# Patient Record
Sex: MALE | Race: WHITE | NOT HISPANIC OR LATINO | Employment: UNEMPLOYED | URBAN - METROPOLITAN AREA
[De-identification: names, ages, dates, MRNs, and addresses within clinical notes are randomized per-mention and may not be internally consistent; named-entity substitution may affect disease eponyms.]

---

## 2019-02-12 ENCOUNTER — OFFICE VISIT (OUTPATIENT)
Dept: FAMILY MEDICINE | Facility: HOSPITAL | Age: 34
End: 2019-02-12
Attending: FAMILY MEDICINE
Payer: MEDICAID

## 2019-02-12 VITALS
BODY MASS INDEX: 31.59 KG/M2 | DIASTOLIC BLOOD PRESSURE: 98 MMHG | HEIGHT: 70 IN | SYSTOLIC BLOOD PRESSURE: 138 MMHG | HEART RATE: 119 BPM | WEIGHT: 220.69 LBS

## 2019-02-12 DIAGNOSIS — Z13.220 SCREENING FOR CHOLESTEROL LEVEL: ICD-10-CM

## 2019-02-12 DIAGNOSIS — M72.0 DUPUYTREN'S CONTRACTURE OF LEFT HAND: Primary | ICD-10-CM

## 2019-02-12 PROCEDURE — 99203 OFFICE O/P NEW LOW 30 MIN: CPT | Performed by: FAMILY MEDICINE

## 2019-02-12 RX ORDER — POTASSIUM CHLORIDE 750 MG/1
10 CAPSULE, EXTENDED RELEASE ORAL DAILY
Refills: 5 | COMMUNITY
Start: 2019-01-19

## 2019-02-12 RX ORDER — VENLAFAXINE HYDROCHLORIDE 150 MG/1
150 CAPSULE, EXTENDED RELEASE ORAL DAILY
Refills: 5 | COMMUNITY
Start: 2019-01-19

## 2019-02-12 RX ORDER — PALIPERIDONE 9 MG/1
9 TABLET, EXTENDED RELEASE ORAL DAILY
Refills: 5 | COMMUNITY
Start: 2019-01-19

## 2019-02-12 RX ORDER — PALIPERIDONE PALMITATE 234 MG/1.5ML
156 INJECTION INTRAMUSCULAR
COMMUNITY
Start: 2019-01-28

## 2019-02-12 RX ORDER — CLOZAPINE 100 MG/1
100 TABLET ORAL NIGHTLY
COMMUNITY
Start: 2019-02-11

## 2019-02-12 NOTE — MEDICAL/APP STUDENT
Subjective:       Patient ID: Robert Callahan is a 33 y.o. overweight male with bipolar disorder here to establish care c/o L hand contracture    Chief Complaint: Establish Care    Patient is a 34 y/o male who presents to the clinic accompanied by his father to establish care with a PCP. He was recently diagnosed with bipolar (type unknown) after a 2 week inpatient psych stay in September 2018. He is followed closely by Dr. Jorge L Brush. Otherwise he has no past medical history. His only complaint is L hand contracture gradually worsening for the last 6 mos. His hand and fingers are fixed in a flexed position and can only be minimally manipulated. Moving the fingers causes some pain. He denies trauma to the hand. Denies CP, SOB, diaphoresis, fever, zelalem.       Review of Systems   Constitutional: Negative for activity change, appetite change, fever and unexpected weight change.   HENT: Negative for congestion, sore throat and trouble swallowing.    Respiratory: Negative for apnea, chest tightness and shortness of breath.    Cardiovascular: Negative for chest pain, palpitations and leg swelling.   Gastrointestinal: Negative for abdominal pain, diarrhea and nausea.   Endocrine: Negative for polydipsia, polyphagia and polyuria.   Genitourinary: Negative for difficulty urinating, dysuria and frequency.   Musculoskeletal: Negative for arthralgias, back pain and joint swelling.   Skin: Negative for color change, pallor and rash.   Neurological: Negative for dizziness, seizures and headaches.   Psychiatric/Behavioral: Negative for agitation, behavioral problems and dysphoric mood.       Objective:      Physical Exam   Constitutional: He is oriented to person, place, and time. He appears well-developed. No distress.   obese   HENT:   Head: Normocephalic and atraumatic.   Mouth/Throat: No oropharyngeal exudate.   Eyes: Conjunctivae and EOM are normal. Pupils are equal, round, and reactive to light. No scleral  icterus.   Neck: Normal range of motion. Neck supple.   Cardiovascular: Regular rhythm and normal heart sounds.   Tachycardic at 120 bpm   Abdominal: Bowel sounds are normal. He exhibits distension. There is no tenderness.   Musculoskeletal: Normal range of motion. He exhibits no edema or tenderness.   L hand contracted in flexed position, 2nd finger has normal ROM, fingers 3-5 are difficult to extend and illicit tenderness with attempt to extend. 5/5  strength on R, 3/5 on L.   Lymphadenopathy:     He has no cervical adenopathy.   Neurological: He is alert and oriented to person, place, and time. No cranial nerve deficit.   Skin: Skin is warm and dry. No rash noted. He is not diaphoretic. No erythema.   Psychiatric: His behavior is normal. Judgment and thought content normal.   Flat affect       Assessment:       Pt is a 33 y.o. Obese  male with bipolar disorder here to establish care c/o L hand contracture, found to be tachycardic in office.    Plan:       Dupuytren's Contracture vs Joint Disease vs Clenched Fist Syndrome  - ambulatory referral to PT  - will re-assess  - If worse at next visit may refer to hand surgery    Tachycardia  - ECG in office WNL  - will monitor    Obesity  - BMI >31  - counseled on healthy diet and options for exercise  - elevated BP in office, will re-assess at next visit  - ordered lipid panel    Bipolar Disorder  - well-controlled per pt and dad  - continue home meds  - closely followed by Dr. Jorge L Brush    RTC in 3 mos for f/u of lipid panel, BP, and hand contracture

## 2019-02-14 NOTE — PROGRESS NOTES
Subjective:       Patient ID: Robert Callahan is a 33 y.o. overweight male with bipolar disorder here to establish care c/o L hand contracture     Chief Complaint: L  hand contracture.     Patient is a 32 y/o male who presents to the clinic accompanied by his father to establish care with a PCP. He was recently diagnosed with bipolar (type unknown) after a 2 week inpatient psych stay in September 2018. He is followed closely by Dr. Jorge L Brush. Otherwise he has no past medical history. His only complaint is L hand contracture gradually worsening for the last 6 mos. His hand and fingers are fixed in a flexed position and can only be minimally manipulated. Moving the fingers causes some pain. He denies trauma to the hand. Denies CP, SOB, diaphoresis, fever, zelalem.         Review of Systems   Constitutional: Negative for activity change, appetite change, fever and unexpected weight change.   HENT: Negative for congestion, sore throat and trouble swallowing.    Respiratory: Negative for apnea, chest tightness and shortness of breath.    Cardiovascular: Negative for chest pain, palpitations and leg swelling.   Gastrointestinal: Negative for abdominal pain, diarrhea and nausea.   Endocrine: Negative for polydipsia, polyphagia and polyuria.   Genitourinary: Negative for difficulty urinating, dysuria and frequency.   Musculoskeletal: Negative for arthralgias, back pain and joint swelling.   Skin: Negative for color change, pallor and rash.   Neurological: Negative for dizziness, seizures and headaches.   Psychiatric/Behavioral: Negative for agitation, behavioral problems and dysphoric mood.       Objective:   Physical Exam   Constitutional: He is oriented to person, place, and time. He appears well-developed. No distress.   obese   HENT:   Head: Normocephalic and atraumatic.   Mouth/Throat: No oropharyngeal exudate.   Eyes: Conjunctivae and EOM are normal. Pupils are equal, round, and reactive to light. No  scleral icterus.   Neck: Normal range of motion. Neck supple.   Cardiovascular: Regular rhythm and normal heart sounds.   Tachycardic at 120 bpm   Abdominal: Bowel sounds are normal. He exhibits distension. There is no tenderness.   Musculoskeletal: Normal range of motion. He exhibits no edema or tenderness.   L hand contracted in flexed position, 2nd finger has normal ROM, fingers 3-5 are difficult to extend and illicit tenderness with attempt to extend. 5/5  strength on R, 3/5 on L.   Lymphadenopathy:     He has no cervical adenopathy.   Neurological: He is alert and oriented to person, place, and time. No cranial nerve deficit.   Skin: Skin is warm and dry. No rash noted. He is not diaphoretic. No erythema.   Psychiatric: His behavior is normal. Judgment and thought content normal.   Flat affect                               Assessment:              1. Dupuytren's Contracture, L hand.    2. Bipolar Disorder             Plan:       Dupuytren's Contracture vs Joint Disease vs Clenched Fist Syndrome  - ambulatory referral to PT  - will re-assess  - If worse at next visit may refer to hand surgery     Tachycardia  - ECG in office WNL  - will monitor     Obesity  - BMI >31  - counseled on healthy diet and options for exercise  - elevated BP in office, will re-assess at next visit  - ordered lipid panel     Bipolar Disorder  - well-controlled per pt and dad  - continue home meds  - closely followed by Dr. Jorge L Brush     RTC in 3 mos for f/u of lipid panel, BP, and hand contracture

## 2019-03-06 ENCOUNTER — CLINICAL SUPPORT (OUTPATIENT)
Dept: REHABILITATION | Facility: HOSPITAL | Age: 34
End: 2019-03-06
Payer: MEDICAID

## 2019-03-06 DIAGNOSIS — M24.542 CONTRACTURE OF JOINT OF FINGER OF LEFT HAND: Primary | ICD-10-CM

## 2019-03-06 PROCEDURE — 97110 THERAPEUTIC EXERCISES: CPT

## 2019-03-06 PROCEDURE — L3806 WHFO W/JOINT(S) CUSTOM FAB: HCPCS

## 2019-03-06 PROCEDURE — 97165 OT EVAL LOW COMPLEX 30 MIN: CPT

## 2019-03-06 NOTE — PLAN OF CARE
[facility] Occupational Therapy  Initial Evaluation     Name: Robert Callahan  Clinic Number: [id number]    Therapy Diagnosis:   Encounter Diagnosis   Name Primary?    Contracture of joint of finger of left hand Yes     Physician: Yaroshevsky, Severyn, MD    Physician Orders: eval and treat   Medical Diagnosis: M72.0 dupuytren's contracture   Surgical Procedure/ Date :none   Evaluation Date: 3/6/2019  Insurance:  Medicaid   Insurance Authorization period Expiration: 2/14/2020  Plan of Care Certification Period: 4/30/19    Visit # / Visits Authortized: 1 / 1 per computer   Time In:2:00  Time Out: 3:30  Total Billable Time: 60 minutes    Precautions: Standard, bipolar disorder     Subjective    Pt reports that he has kept his hand fisted for months and he does not know why. Pt's dad present and helped answer questions, pt with flat affect throughout session.    Involved Side:  left  Dominant Side: Right  Date of Onset:  8 months ago   Mechanism of Injury: unknown, pt reports he had been keeping his hand fisted for the last 8 months.   History of Current Condition: left hand fisted flexion contracture , mainly middle , ring and little fingers     Imaging: none   Previous Therapy: none     Patient's Goals for Therapy: more active use and extension of left fingers     Pain:  Functional Pain Scale Rating 0-10:   2/10 on average  0/10 at best  2/10 at worst  Locationleft:hand   Description: Burning  Aggravating Factors: Bending and extension of fingers   Easing Factors: massage    Occupation:  Not working   Working presently: unemployed  Duties: none. Lives at home with son     Functional Limitations/Social History:    Previous functional status includes: Independent with all ADLs.     Current FunctionalStatus   Home/Living environment : lives with their family      Limitation of Functional Status as follows:   ADLs/IADLs:     - Feeding:Not Necessary    - Bathing:Not Necessary    -  Dressing/Grooming: Not Necessary    - Driving:  Not driving      Leisure: none       Past Medical History/Physical Systems Review:   Robert Callahan  HX of bipolar disorder .     Robert Callahan  has no past surgical history on file.    Robert has a current medication list which includes the following prescription(s): clozapine, invega sustenna, paliperidone, potassium chloride, and venlafaxine.    Review of patient's allergies indicates:  No Known Allergies       Objective     Observation/Appearance:  Skin intact. Moisture to palm,     Edema. Measured in centimeters. No edema noted       Hand ROM. Measured in degrees.   3/6/2019       Left             Index: MP  0/70                 PIP     0/100                 DIP 0/40                 BAIN              Long:  MP -40/80                 PIP -60/100                 DIP +10/0                 BAIN              Ring:   MP -70/80                 PIP -70/100                 DIP +20/0                 BAIN              Small:  MP -70/70                  PIP -50/90                  DIP +20/0                 BAIN              Thumb: MP wnl                   IP /wnl          Rad ADD/ABD wnl          Pal ADD/ABD wnl          Opposition  wnl              WRIST         flexion 30      Ext  30      RD 10      UD 10      Pro 80      Sup  80                            Sensation  Median Nerve Distribution 3/6/2019 3/6/2019    Left Right   [place]     Normal 1.65-2.83     Diminished Light Touch 3.22-3.61     Diminished Protective 3.84-4.31     Loss of Protective 4.56-6.65     Untestable >6.65     2 Point Discrimination     Static     Dynamic            Strength (Dyanmometer) and Pinch Strength (Pinch Gauge)  Measured in pounds and psi. Average of three trials.   3/6/2019 3/6/2019        Left Right        Rung II nt nt       Key Pinch nt nt       3pt Pinch nt nt       2pt Pinch nt nt           CMS Impairment/Limitation/Restriction for FOTO initial   Survey    Therapist reviewed FOTO scores for Robert Callahan on 3/6/2019.   FOTO documents entered into [employer] - see Media section.                    3/6/2019   Limitation Score: 62%  Category: Self care         Current : CL = least 60% but < 80% impaired, limited or restricted  Goal: CK = at least 40% but < 60% impaired, limited or restricted  Discharge:          Treatment     Treatment Time In:  2:30  Treatment Time Out: 3:30   Total Treatment time separate from Evaluation time:50    Jonas received the following supervised modalities after being cleared for contradictions for 10 minutes:   -MH to left hand    Jonas received the following manual therapy techniques for 10 minutes:   -PROM to left hand MP. Pip extensions     Fabricated resting hand splint with Mp's at 50degrees of flexion after stretching , pt's dad present to review wearing instructions   Instructed to wear 2 hours in AM and 3 hours in PM til next visit.  Wipe splint daily with alcohol       Home Exercise Program/Education:  Issued HEP: , splint use and positioning of left hand to open fingers .      and educated on modality use for pain management . Exercises were reviewed and Jonas was able to demonstrate them prior to the end of the session.   Pt received a written copy of exercises to perform at home. Jonas demonstrated fair  understanding of the education provided.  Pt was advised to perform these exercises free of pain, and to stop performing them if pain occurs.    Patient/Family Education: role of OT, goals for OT, scheduling/cancellations - pt verbalized understanding. Discussed insurance limitations with patient.    Additional Education provided: none     Assessment     Robert Callahan is a 33 y.o. male referred to outpatient occupational/hand therapy and presents with a medical diagnosis of left flexion contracture , resulting in fisted left hand  and demonstrates limitations as described in the chart below. Following  medical  record review it is determined that pt will benefit from occupational therapy services in order to maximize pain free and/or functional use of left hand .     The patient's rehab potential is Good.     Anticipated barriers to occupational therapy: none   Pt has no cultural, educational or language barriers to learning provided.    Profile and History Assessment of Occupational Performance Level of Clinical Decision Making Complexity Score   Occupational Profile:   Robert Callahan is a 33 y.o. male who lives with their family and is currently employed . Robert Callahan has difficulty with  Dressing, fine motor to manage clothing     affecting his/her daily functional abilities. His/her main goal for therapy is use of left hand .     Comorbidities:    has no past medical history on file.    Medical and Therapy History Review:   Brief               Performance Deficits , finger contractures      Physical:  Decrease rom     Cognitive:  Initiation  Emotional Control    Psychosocial:    Social Interaction     Clinical Decision Making:  low    Assessment Process:  Problem-Focused Assessments    Modification/Need for Assistance:  Not Necessary    Intervention Selection:  Limited Treatment Options       low  Based on PMHX, co morbidities , data from assessments and functional level of assistance required with task and clinical presentation directly impacting function.       The following goals were discussed with the patient and patient is in agreement with them as to be addressed in the treatment plan.          GOALS: 6 weeks. Pt agrees with goals set.  Goals:     Short Term (4 weeks on 4/6/19):  1)   Patient to be IND with HEP and modalities for pain management, progressing   2)   Increase ROM 10 degrees  For  Fingers Mp extension left hand  motion to increase functional hand use for left hand , progressing       Long Term (by discharge):  1)   Pt will report 0 out of 10 pain with left hand ., progressing    2)   Patient to score of CJ on FOTO to demonstrate improved perception of functionalleft hand/ arm  Use. Progressing   3)   Pt will return to prior level of function for ADLs and household management, progressing              Plan   Certification Period/Plan of care expiration: 3/6/2019 to 5/30/19 .    Outpatient Occupational Therapy 1 times weekly for 6 weeks may include the following interventions: Paraffin, Therapeutic exercises/activities. and Orthotic Fabrication/Fit/Training.      Jes Brown, OT

## 2019-03-12 ENCOUNTER — CLINICAL SUPPORT (OUTPATIENT)
Dept: REHABILITATION | Facility: HOSPITAL | Age: 34
End: 2019-03-12
Payer: MEDICAID

## 2019-03-12 DIAGNOSIS — M24.542 CONTRACTURE OF JOINT OF FINGER OF LEFT HAND: Primary | ICD-10-CM

## 2019-03-12 PROCEDURE — 97110 THERAPEUTIC EXERCISES: CPT

## 2019-03-12 NOTE — PROGRESS NOTES
Occupational Therapy Daily Treatment Note   Date 3/12/2019  Name: Robert Callahan  Clinic Number: [id number]     Therapy Diagnosis:        Encounter Diagnosis   Name Primary?    Contracture of joint of finger of left hand Yes      Physician: Yaroshevsky, Severyn, MD     Physician Orders: eval and treat   Medical Diagnosis: M72.0 dupuytren's contracture   Surgical Procedure/ Date :none   Evaluation Date: 3/6/2019  Insurance:  Medicaid   Insurance Authorization period Expiration: 2/14/2020  Plan of Care Certification Period: 4/30/19     Visit # / Visits Authortized: 1 / 1 per computer   Time In:2:00  Time Out: 3:30  Total Billable Time: 60 minutes     Precautions: Standard, bipolar disorder        Subjective     Pt 's dad reported that his hand was really fisted for 3 years   . Pt's dad present and helped answer questions, pt with flat affect throughout session.    Involved Side:  left  Dominant Side: Right  Date of Onset:  8 months ago   Mechanism of Injury: unknown, pt reports he had been keeping his hand fisted for the last 8 months.   History of Current Condition: left hand fisted flexion contracture , mainly middle , ring and little fingers         Pt reports: he was compliant with home exercise program given last session.   Response to previous treatment:able to wear splint 5 years no problem   Functional change: reports pt does like to draw    Pain: 1/10  Location: left fingers      Objective         Jonas received the following supervised modalities after being cleared for contradictions for 10 MH  minutes:     Jonas received the following manual therapy techniques for 30  minutes: PROM stretching   Hands , was able to use an oval 8 size 7 splint to middle finger DIP to prevent further hyperextension      increase wear schedule of splint 6 hours during waking day , and 10 hours or less at night , pt's dad reports that he sometimes sleeps 14 hours with meds        Home Exercises  and Education Provided     Education provided:   - anatomy of hands flexion contractures     - Progress towards goals     Written Home Exercises Provided: Patient instructed to cont prior HEP.  Exercises were reviewed and Jonas was able to demonstrate them prior to the end of the session.  Jonas demonstrated fair  understanding of the education provided.         Assessment     Pt would continue to benefit from skilled OT. Yes      Jonas is progressing well towards his goals and there are no updates to goals at this time. Pt prognosis is Good.     Pt will continue to benefit from skilled outpatient occupational therapy to address the deficits listed in the problem list on initial evaluation provide pt/family education and to maximize pt's level of independence in the home and community environment.     Anticipated barriers to occupational therapy:  Bipolar , mood disorders     Pt's spiritual, cultural and educational needs considered and pt agreeable to plan of care and goals.    Goals:  GOALS: 6 weeks. Pt agrees with goals set.  Goals:      Short Term (4 weeks on 4/6/19):  1)   Patient to be IND with HEP and modalities for pain management, progressing   2)   Increase ROM 10 degrees  For  Fingers Mp extension left hand  motion to increase functional hand use for left hand , progressing         Long Term (by discharge):  1)   Pt will report 0 out of 10 pain with left hand ., progressing   2)   Patient to score of CJ on FOTO to demonstrate improved perception of functionalleft hand/ arm  Use. Progressing   3)   Pt will return to prior level of function for ADLs and household management, progressing                  Plan   Certification Period/Plan of care expiration: 3/6/2019 to 5/30/19 .     Outpatient Occupational Therapy 1 times weekly for 6 weeks may include the following interventions: Paraffin, Therapeutic exercises/activities. and Orthotic Fabrication/Fit/Training.          Jes Brown, OT

## 2019-03-19 ENCOUNTER — CLINICAL SUPPORT (OUTPATIENT)
Dept: REHABILITATION | Facility: HOSPITAL | Age: 34
End: 2019-03-19
Payer: MEDICAID

## 2019-03-19 DIAGNOSIS — M24.542 CONTRACTURE OF JOINT OF FINGER OF LEFT HAND: Primary | ICD-10-CM

## 2019-03-19 PROCEDURE — 97110 THERAPEUTIC EXERCISES: CPT

## 2019-03-19 NOTE — PROGRESS NOTES
Occupational Therapy Daily Treatment Note   Date 3/19/2019  Name: Robert Callahan  Clinic Number: [id number]     Therapy Diagnosis:        Encounter Diagnosis   Name Primary?    Contracture of joint of finger of left hand Yes      Physician: Yaroshevsky, Severyn, MD     Physician Orders: eval and treat   Medical Diagnosis: M72.0 dupuytren's contracture   Surgical Procedure/ Date :none   Evaluation Date: 3/6/2019  Insurance:  Medicaid   Insurance Authorization period Expiration: 2/14/2020  Plan of Care Certification Period: 4/30/19     Visit # / Visits Authortized: 3/ 12per computer   Time In:3:00  Time Out: 4:00  Total Billable Time: 60 minutes     Precautions: Standard, bipolar disorder        Subjective     Pt 's dad reported that his has been able to wear the splint without any problems .   . Pt's dad present and helped answer questions, pt with flat affect throughout session.    Involved Side:  left  Dominant Side: Right  Date of Onset:  8 months ago   Mechanism of Injury: unknown, pt reports he had been keeping his hand fisted for the last 8 months.   History of Current Condition: left hand fisted flexion contracture , mainly middle , ring and little fingers         Pt reports: he was compliant with home exercise program given last session.   Response to previous treatment:able to wear splint 5 years no problem   Functional change: reports pt does like to draw    Pain: 1/10  Location: left fingers      Objective         Jonas received the following supervised modalities after being cleared for contradictions for 10 MH  minutes:     Jonas received the following manual therapy techniques for 30  minutes: PROM stretching   Hands , was able to use an oval 8 size 7 splint to middle finger DIP to prevent further hyperextension    Adjusted splint to ring finger added orficast to left ring finger over PIP flexion to encourage increased static pip extension  And to be worn with resting  hand splint .      increase wear schedule of splint 6 hours during waking day , and 10 hours or less at night , pt's dad reports that he sometimes sleeps 14 hours with meds        Home Exercises and Education Provided     Education provided:   - anatomy of hands flexion contractures     - Progress towards goals     Written Home Exercises Provided: Patient instructed to cont prior HEP.  Exercises were reviewed and Jonas was able to demonstrate them prior to the end of the session.  Jonas demonstrated fair  understanding of the education provided.         Assessment     Pt would continue to benefit from skilled OT. Yes      Jonas is progressing well towards his goals and there are no updates to goals at this time. Pt prognosis is Good.     Pt will continue to benefit from skilled outpatient occupational therapy to address the deficits listed in the problem list on initial evaluation provide pt/family education and to maximize pt's level of independence in the home and community environment.     Anticipated barriers to occupational therapy:  Bipolar , mood disorders     Pt's spiritual, cultural and educational needs considered and pt agreeable to plan of care and goals.    Goals:  GOALS: 6 weeks. Pt agrees with goals set.  Goals:      Short Term (4 weeks on 4/6/19):  1)   Patient to be IND with HEP and modalities for pain management, progressing   2)   Increase ROM 10 degrees  For  Fingers Mp extension left hand  motion to increase functional hand use for left hand , progressing         Long Term (by discharge):  1)   Pt will report 0 out of 10 pain with left hand ., progressing   2)   Patient to score of CJ on FOTO to demonstrate improved perception of functionalleft hand/ arm  Use. Progressing   3)   Pt will return to prior level of function for ADLs and household management, progressing                  Plan   Certification Period/Plan of care expiration: 3/6/2019 to 5/30/19 .     Outpatient Occupational Therapy 1  times weekly for 6 weeks may include the following interventions: Paraffin, Therapeutic exercises/activities. and Orthotic Fabrication/Fit/Training.          Jes Brown OT

## 2019-03-21 ENCOUNTER — CLINICAL SUPPORT (OUTPATIENT)
Dept: REHABILITATION | Facility: HOSPITAL | Age: 34
End: 2019-03-21
Payer: MEDICAID

## 2019-03-21 DIAGNOSIS — M24.542 CONTRACTURE OF JOINT OF FINGER OF LEFT HAND: Primary | ICD-10-CM

## 2019-03-21 PROCEDURE — 97140 MANUAL THERAPY 1/> REGIONS: CPT

## 2019-03-21 PROCEDURE — 97530 THERAPEUTIC ACTIVITIES: CPT

## 2019-03-21 NOTE — PROGRESS NOTES
Occupational Therapy Daily Treatment Note   Date 3/21/2019  Name: Robert Callahan  Clinic Number: [id number]     Therapy Diagnosis:        Encounter Diagnosis   Name Primary?    Contracture of joint of finger of left hand Yes      Physician: Yaroshevsky, Severyn, MD     Physician Orders: eval and treat   Medical Diagnosis: M72.0 dupuytren's contracture   Surgical Procedure/ Date :none   Evaluation Date: 3/6/2019  Insurance:  Medicaid   Insurance Authorization period Expiration: 2/14/2020  Plan of Care Certification Period: 4/30/19     Visit # / Visits Authortized: 4/ 12per computer   Time In:4:00  Time Out: 5:00  Total Billable Time: 60 minutes     Precautions: Standard, bipolar disorder        Subjective     Pt 's dad reported that his has been able to wear the splint without any problems .   . Pt's dad present and helped answer questions, pt with flat affect throughout session.    Involved Side:  left  Dominant Side: Right  Date of Onset:  8 months ago   Mechanism of Injury: unknown, pt reports he had been keeping his hand fisted for the last 8 months.   History of Current Condition: left hand fisted flexion contracture , mainly middle , ring and little fingers         Pt reports: he was compliant with home exercise program given last session.   Response to previous treatment:able to wear splint 5 years no problem   Functional change: reports pt does like to draw    Pain: 1/10  Location: left fingers      Objective         Jonas received the following supervised modalities after being cleared for contradictions for 10 MH  minutes:     Jonas received the following manual therapy techniques for 30  minutes: PROM stretching   Hands ,     TA: use of left hand to  cones and supinate and towel walks and finger extension all fingers on table top       increase wear schedule of splint 6 hours during waking day , and 10 hours or less at night , pt's dad reports that he sometimes sleeps  14 hours with meds        Home Exercises and Education Provided     Education provided:   - anatomy of hands flexion contractures     - Progress towards goals     Written Home Exercises Provided: Patient instructed to cont prior HEP.  Exercises were reviewed and Jonas was able to demonstrate them prior to the end of the session.  Jonas demonstrated fair  understanding of the education provided.         Assessment     Pt would continue to benefit from skilled OT. Yes      Jonas is progressing well towards his goals and there are no updates to goals at this time. Pt prognosis is Good.     Pt will continue to benefit from skilled outpatient occupational therapy to address the deficits listed in the problem list on initial evaluation provide pt/family education and to maximize pt's level of independence in the home and community environment.     Anticipated barriers to occupational therapy:  Bipolar , mood disorders     Pt's spiritual, cultural and educational needs considered and pt agreeable to plan of care and goals.    Goals:  GOALS: 6 weeks. Pt agrees with goals set.  Goals:      Short Term (4 weeks on 4/6/19):  1)   Patient to be IND with HEP and modalities for pain management, progressing   2)   Increase ROM 10 degrees  For  Fingers Mp extension left hand  motion to increase functional hand use for left hand , progressing         Long Term (by discharge):  1)   Pt will report 0 out of 10 pain with left hand ., progressing   2)   Patient to score of CJ on FOTO to demonstrate improved perception of functionalleft hand/ arm  Use. Progressing   3)   Pt will return to prior level of function for ADLs and household management, progressing                  Plan   Certification Period/Plan of care expiration: 3/6/2019 to 5/30/19 .     Outpatient Occupational Therapy 1 times weekly for 6 weeks may include the following interventions: Paraffin, Therapeutic exercises/activities. and Orthotic Fabrication/Fit/Training.           Jes Brown, OT

## 2019-03-26 ENCOUNTER — CLINICAL SUPPORT (OUTPATIENT)
Dept: REHABILITATION | Facility: HOSPITAL | Age: 34
End: 2019-03-26
Payer: MEDICAID

## 2019-03-26 DIAGNOSIS — M24.542 CONTRACTURE OF JOINT OF FINGER OF LEFT HAND: Primary | ICD-10-CM

## 2019-03-26 PROCEDURE — 97110 THERAPEUTIC EXERCISES: CPT

## 2019-03-26 NOTE — PROGRESS NOTES
Occupational Therapy Daily Treatment Note   Date 3/26/2019  Name: Robert Callahan  Clinic Number: [id number]     Therapy Diagnosis:        Encounter Diagnosis   Name Primary?    Contracture of joint of finger of left hand Yes      Physician: Yaroshevsky, Severyn, MD     Physician Orders: eval and treat   Medical Diagnosis: M72.0 dupuytren's contracture   Surgical Procedure/ Date :none   Evaluation Date: 3/6/2019  Insurance:  Medicaid   Insurance Authorization period Expiration: 2/14/2020  Plan of Care Certification Period: 4/30/19     Visit # / Visits Authortized: 5/12per computer   Time In:5:00  Time Out: 6:00pm   Total Billable Time: 60 minutes     Precautions: Standard, bipolar disorder        Subjective     Pt 's dad reported that his has been able to wear the splint without any problems .   . Pt's dad present and helped answer questions, pt with flat affect throughout session.    Involved Side:  left  Dominant Side: Right  Date of Onset:  8 months ago   Mechanism of Injury: unknown, pt reports he had been keeping his hand fisted for the last 8 months.   History of Current Condition: left hand fisted flexion contracture , mainly middle , ring and little fingers         Pt reports: he was compliant with home exercise program given last session.   Response to previous treatment:able to wear splint 5 years no problem   Functional change: reports pt does like to draw    Pain: 1/10  Location: left fingers      Objective         Jonas received the following supervised modalities after being cleared for contradictions for 10 MH  minutes:     Jonas received the following manual therapy techniques for 30  minutes: PROM stretching   Hands ,     TA: use of left hand to  cones and supinate and towel walks and finger extension all fingers on table top       increase wear schedule of splint 6 hours during waking day , and 10 hours or less at night , pt's dad reports that he sometimes sleeps  14 hours with meds        Home Exercises and Education Provided     Education provided:   - anatomy of hands flexion contractures     - Progress towards goals     Written Home Exercises Provided: Patient instructed to cont prior HEP.  Exercises were reviewed and Jonas was able to demonstrate them prior to the end of the session.  Jonas demonstrated fair  understanding of the education provided.         Assessment     Pt would continue to benefit from skilled OT. Yes      Jonas is progressing well towards his goals and there are no updates to goals at this time. Pt prognosis is Good.     Pt will continue to benefit from skilled outpatient occupational therapy to address the deficits listed in the problem list on initial evaluation provide pt/family education and to maximize pt's level of independence in the home and community environment.     Anticipated barriers to occupational therapy:  Bipolar , mood disorders     Pt's spiritual, cultural and educational needs considered and pt agreeable to plan of care and goals.    Goals:  GOALS: 6 weeks. Pt agrees with goals set.  Goals:      Short Term (4 weeks on 4/6/19):  1)   Patient to be IND with HEP and modalities for pain management, progressing   2)   Increase ROM 10 degrees  For  Fingers Mp extension left hand  motion to increase functional hand use for left hand , progressing         Long Term (by discharge):  1)   Pt will report 0 out of 10 pain with left hand ., progressing   2)   Patient to score of CJ on FOTO to demonstrate improved perception of functionalleft hand/ arm  Use. Progressing   3)   Pt will return to prior level of function for ADLs and household management, progressing                  Plan   Certification Period/Plan of care expiration: 3/6/2019 to 5/30/19 .     Outpatient Occupational Therapy 1 times weekly for 6 weeks may include the following interventions: Paraffin, Therapeutic exercises/activities. and Orthotic Fabrication/Fit/Training.           Jes Brown, OT

## 2019-03-28 ENCOUNTER — CLINICAL SUPPORT (OUTPATIENT)
Dept: REHABILITATION | Facility: HOSPITAL | Age: 34
End: 2019-03-28
Payer: MEDICAID

## 2019-03-28 DIAGNOSIS — M24.542 CONTRACTURE OF JOINT OF FINGER OF LEFT HAND: Primary | ICD-10-CM

## 2019-03-28 PROCEDURE — 97110 THERAPEUTIC EXERCISES: CPT

## 2019-03-28 NOTE — PROGRESS NOTES
Occupational Therapy Daily Treatment Note   Date 3/28/2019  Name: Robert Callahan  Clinic Number: [id number]     Therapy Diagnosis:        Encounter Diagnosis   Name Primary?    Contracture of joint of finger of left hand Yes      Physician: Yaroshevsky, Severyn, MD     Physician Orders: eval and treat   Medical Diagnosis: M72.0 dupuytren's contracture   Surgical Procedure/ Date :none   Evaluation Date: 3/6/2019  Insurance:  Medicaid   Insurance Authorization period Expiration: 2/14/2020  Plan of Care Certification Period: 4/30/19     Visit # / Visits Authortized:7/ 12per computer   Time In:5:00  Time Out: 6:00pm   Total Billable Time: 60 minutes     Precautions: Standard, bipolar disorder        Subjective     Pt 's dad reported that his has been able to wear the splint without any problems .   . Pt's dad present and helped answer questions, pt with flat affect throughout session.    Involved Side:  left  Dominant Side: Right  Date of Onset:  8 months ago   Mechanism of Injury: unknown, pt reports he had been keeping his hand fisted for the last 8 months.   History of Current Condition: left hand fisted flexion contracture , mainly middle , ring and little fingers         Pt reports: he was compliant with home exercise program given last session.   Response to previous treatment:able to wear splint 5 years no problem   Functional change: reports pt does like to draw    Pain: 1/10  Location: left fingers      Objective     AROM  Index Middle Ring  Little   0/70 5/80 5/80 -30/80  0/100 -50/100 -50/100 -30/90  0/40 +10/0 +10/0  +20/50        Jonas received the following supervised modalities after being cleared for contradictions for 10 MH  minutes:     Jonas received the following manual therapy techniques for 30  minutes: PROM stretching   Hands ,     TA: use of left hand to  cones and supinate and towel walks and finger extension all fingers on table top       increase wear  schedule of splint 6 hours during waking day , and 10 hours or less at night , pt's dad reports that he sometimes sleeps 14 hours with meds        Home Exercises and Education Provided     Education provided:   - anatomy of hands flexion contractures     - Progress towards goals     Written Home Exercises Provided: Patient instructed to cont prior HEP.  Recommended pt start more bilateral functional task , using nuts and bolts , tying shoes , threading string and beads use of hand gripper , clothespins for pinch       Exercises were reviewed and Jonas was able to demonstrate them prior to the end of the session.  Jonas demonstrated fair  understanding of the education provided.         Assessment     Pt would continue to benefit from skilled OT. Yes      Jonas is progressing well towards his goals and there are no updates to goals at this time. Pt prognosis is Good.     Pt will continue to benefit from skilled outpatient occupational therapy to address the deficits listed in the problem list on initial evaluation provide pt/family education and to maximize pt's level of independence in the home and community environment.     Anticipated barriers to occupational therapy:  Bipolar , mood disorders     Pt's spiritual, cultural and educational needs considered and pt agreeable to plan of care and goals.      GOALS: 6 weeks. Pt agrees with goals set.       Short Term (4 weeks on 4/6/19):  1)   Patient to be IND with HEP and modalities for pain management, progressing   2)   Increase ROM 10 degrees  For  Fingers Mp extension left hand  motion to increase functional hand use for left hand , progressing         Long Term (by discharge):  1)   Pt will report 0 out of 10 pain with left hand ., progressing   2)   Patient to score of CJ on FOTO to demonstrate improved perception of functionalleft hand/ arm  Use. Progressing   3)   Pt will return to prior level of function for ADLs and household management, progressing                Plan   Certification Period/Plan of care expiration: 3/6/2019 to 5/30/19 .     Outpatient Occupational Therapy 1 times weekly for 6 weeks may include the following interventions: Paraffin, Therapeutic exercises/activities. and Orthotic Fabrication/Fit/Training.          Jes Brown, OT

## 2019-04-02 ENCOUNTER — CLINICAL SUPPORT (OUTPATIENT)
Dept: REHABILITATION | Facility: HOSPITAL | Age: 34
End: 2019-04-02
Payer: MEDICAID

## 2019-04-02 DIAGNOSIS — M24.542 CONTRACTURE OF JOINT OF FINGER OF LEFT HAND: Primary | ICD-10-CM

## 2019-04-02 PROCEDURE — 97110 THERAPEUTIC EXERCISES: CPT

## 2019-04-02 NOTE — PROGRESS NOTES
Occupational Therapy Daily Treatment Note   Date 4/2/2019  Name: Robert Callahan  Clinic Number: [id number]     Therapy Diagnosis:        Encounter Diagnosis   Name Primary?    Contracture of joint of finger of left hand Yes      Physician: Yaroshevsky, Severyn, MD     Physician Orders: eval and treat   Medical Diagnosis: M72.0 dupuytren's contracture   Surgical Procedure/ Date :none   Evaluation Date: 3/6/2019  Insurance:  Medicaid   Insurance Authorization period Expiration: 2/14/2020  Plan of Care Certification Period: 4/30/19     Visit # / Visits Authortized:7/ 12per computer   Time In:5:00  Time Out: 6:00pm   Total Billable Time: 60 minutes     Precautions: Standard, bipolar disorder        Subjective     Pt 's dad reported that his has been able to wear the splint without any problems .   . Pt's dad present and helped answer questions, pt with flat affect throughout session.    Involved Side:  left  Dominant Side: Right  Date of Onset:  8 months ago   Mechanism of Injury: unknown, pt reports he had been keeping his hand fisted for the last 8 months.   History of Current Condition: left hand fisted flexion contracture , mainly middle , ring and little fingers         Pt reports: he was compliant with home exercise program given last session.   Response to previous treatment:able to wear splint 5 years no problem   Functional change: reports pt does like to draw    Pain: 1/10  Location: left fingers      Objective     AROM 3/28/19   Index Middle Ring  Little   0/70 5/80 5/80 -30/80  0/100 -50/100 -50/100 -30/90  0/40 +10/0 +10/0  +20/50        Jonas received the following supervised modalities after being cleared for contradictions for 10 MH  minutes:     Jonas received the following manual therapy techniques for 30  minutes: PROM stretching   Hands ,     TA: use of left hand to  velcro checkers finger extension table top, yellow finger extensor exerciser and , tried isopheres  , however pt unable to performon this date     increase wear schedule of splint 6 hours during waking day , and 10 hours or less at night , pt's dad reports that he sometimes sleeps 14 hours with meds        Home Exercises and Education Provided     Education provided:   - anatomy of hands flexion contractures     - Progress towards goals     Written Home Exercises Provided: Patient instructed to cont prior HEP.  Recommended pt start more bilateral functional task , using nuts and bolts , tying shoes , threading string and beads use of hand gripper , clothespins for pinch       Exercises were reviewed and Jonas was able to demonstrate them prior to the end of the session.  Jonas demonstrated fair  understanding of the education provided.         Assessment     Pt would continue to benefit from skilled OT. Yes      Jonas is progressing well towards his goals and there are no updates to goals at this time. Pt prognosis is Good.     Pt will continue to benefit from skilled outpatient occupational therapy to address the deficits listed in the problem list on initial evaluation provide pt/family education and to maximize pt's level of independence in the home and community environment.     Anticipated barriers to occupational therapy:  Bipolar , mood disorders     Pt's spiritual, cultural and educational needs considered and pt agreeable to plan of care and goals.      GOALS: 6 weeks. Pt agrees with goals set.       Short Term (4 weeks on 4/6/19):  1)   Patient to be IND with HEP and modalities for pain management, progressing   2)   Increase ROM 10 degrees  For  Fingers Mp extension left hand  motion to increase functional hand use for left hand , progressing         Long Term (by discharge):  1)   Pt will report 0 out of 10 pain with left hand ., progressing   2)   Patient to score of CJ on FOTO to demonstrate improved perception of functionalleft hand/ arm  Use. Progressing   3)   Pt will return to prior level of  function for ADLs and household management, progressing               Plan   Certification Period/Plan of care expiration: 3/6/2019 to 5/30/19 .     Outpatient Occupational Therapy 1 times weekly for 6 weeks may include the following interventions: Paraffin, Therapeutic exercises/activities. and Orthotic Fabrication/Fit/Training.          Jes Bronw, OT

## 2019-04-04 ENCOUNTER — CLINICAL SUPPORT (OUTPATIENT)
Dept: REHABILITATION | Facility: HOSPITAL | Age: 34
End: 2019-04-04
Payer: MEDICAID

## 2019-04-04 PROCEDURE — 97110 THERAPEUTIC EXERCISES: CPT

## 2019-04-04 NOTE — PROGRESS NOTES
Occupational Therapy Daily Treatment Note   Date 4/4/2019  Name: Robert Callahan  Clinic Number: [id number]     Therapy Diagnosis:        Encounter Diagnosis   Name Primary?    Contracture of joint of finger of left hand Yes      Physician: Yaroshevsky, Severyn, MD     Physician Orders: eval and treat   Medical Diagnosis: M72.0 dupuytren's contracture   Surgical Procedure/ Date :none   Evaluation Date: 3/6/2019  Insurance:  Medicaid   Insurance Authorization period Expiration: 2/14/2020  Plan of Care Certification Period: 4/30/19     Visit # / Visits Authortized:8/ 12per computer   Time In:12;00   Time Out: 1:00 pm   Total Billable Time: 60 minutes     Precautions: Standard, bipolar disorder        Subjective     Pt 's dad reported that his has been able to wear the splint without any problems .   . Pt's dad present and helped answer questions, pt with flat affect throughout session.    Involved Side:  left  Dominant Side: Right  Date of Onset:  8 months ago   Mechanism of Injury: unknown, pt reports he had been keeping his hand fisted for the last 8 months.   History of Current Condition: left hand fisted flexion contracture , mainly middle , ring and little fingers         Pt reports: he was compliant with home exercise program given last session.   Response to previous treatment:able to wear splint 5 years no problem   Functional change: reports pt does like to draw    Pain: 1/10  Location: left fingers      Objective     AROM 3/28/19   Index Middle Ring  Little   0/70 5/80 5/80 -30/80  0/100 -50/100 -50/100 -30/90  0/40 +10/0 +10/0  +20/50        Jonas received the following supervised modalities after being cleared for contradictions for 10 MH  minutes:     Jonas received the following manual therapy techniques for 30  minutes: PROM stretching   Hands ,     TA: use of left hand to  velcro checkers finger extension table top, yellow finger extensor exerciser and , tried  isopheres , however pt unable to performon this date     increase wear schedule of splint 6 hours during waking day , and 10 hours or less at night , pt's dad reports that he sometimes sleeps 14 hours with meds        Home Exercises and Education Provided     Education provided:   - anatomy of hands flexion contractures     - Progress towards goals     Written Home Exercises Provided: Patient instructed to cont prior HEP.  Recommended pt start more bilateral functional task , using nuts and bolts , tying shoes , threading string and beads use of hand gripper , clothespins for pinch       Exercises were reviewed and Jonas was able to demonstrate them prior to the end of the session.  Jonas demonstrated fair  understanding of the education provided.         Assessment     Pt would continue to benefit from skilled OT. Yes      Jonas is progressing well towards his goals and there are no updates to goals at this time. Pt prognosis is Good.     Pt will continue to benefit from skilled outpatient occupational therapy to address the deficits listed in the problem list on initial evaluation provide pt/family education and to maximize pt's level of independence in the home and community environment.     Anticipated barriers to occupational therapy:  Bipolar , mood disorders     Pt's spiritual, cultural and educational needs considered and pt agreeable to plan of care and goals.      GOALS: 6 weeks. Pt agrees with goals set.       Short Term (4 weeks on 4/6/19):  1)   Patient to be IND with HEP and modalities for pain management, progressing   2)   Increase ROM 10 degrees  For  Fingers Mp extension left hand  motion to increase functional hand use for left hand , progressing         Long Term (by discharge):  1)   Pt will report 0 out of 10 pain with left hand ., progressing   2)   Patient to score of CJ on FOTO to demonstrate improved perception of functionalleft hand/ arm  Use. Progressing   3)   Pt will return to prior level  of function for ADLs and household management, progressing               Plan   Certification Period/Plan of care expiration: 3/6/2019 to 5/30/19 .     Outpatient Occupational Therapy 1 times weekly for 6 weeks may include the following interventions: Paraffin, Therapeutic exercises/activities. and Orthotic Fabrication/Fit/Training.          Jes Brown, OT

## 2019-04-11 ENCOUNTER — CLINICAL SUPPORT (OUTPATIENT)
Dept: REHABILITATION | Facility: HOSPITAL | Age: 34
End: 2019-04-11
Payer: MEDICAID

## 2019-04-11 DIAGNOSIS — M24.542 CONTRACTURE OF JOINT OF FINGER OF LEFT HAND: Primary | ICD-10-CM

## 2019-04-11 PROCEDURE — 97110 THERAPEUTIC EXERCISES: CPT

## 2019-04-11 NOTE — PROGRESS NOTES
Occupational Therapy Daily Treatment Note   Date 4/11/2019  Name: Robert Callahan  Clinic Number: [id number]     Therapy Diagnosis:        Encounter Diagnosis   Name Primary?    Contracture of joint of finger of left hand Yes      Physician: Yaroshevsky, Severyn, MD     Physician Orders: eval and treat   Medical Diagnosis: M72.0 dupuytren's contracture   Surgical Procedure/ Date :none   Evaluation Date: 3/6/2019  Insurance:  Medicaid   Insurance Authorization period Expiration: 2/14/2020  Plan of Care Certification Period: 4/30/19     Visit # / Visits Authortized:8/12per computer   Time In:5:00  Time Out:6:00  pm   Total Billable Time: 60 minutes     Precautions: Standard, bipolar disorder        Subjective     Pt 's dad reported that his has been able to wear the splint without any problems .   . Pt's dad present and helped answer questions, pt with flat affect throughout session.    Involved Side:  left  Dominant Side: Right  Date of Onset:  8 months ago   Mechanism of Injury: unknown, pt reports he had been keeping his hand fisted for the last 8 months.   History of Current Condition: left hand fisted flexion contracture , mainly middle , ring and little fingers         Pt reports: he was compliant with home exercise program given last session.   Response to previous treatment:able to wear splint 5 years no problem   Functional change: reports pt does like to draw    Pain: 1/10  Location: left fingers      Objective     AROM 3/28/19   Index Middle Ring  Little   0/70 5/80 5/80 -30/80  0/100 -50/100 -50/100 -30/90  0/40 +10/0 +10/0  +20/50        Jonas received the following supervised modalities after being cleared for contradictions for 10 MH  minutes:     Jonas received the following manual therapy techniques for 30  minutes: PROM stretching   Hands ,     TA: use of left hand to  velcro checkers finger extension table top, yellow finger extensor exerciser and , tried isopheres  , however pt unable to performon this date     Attempted us eof LMB finger splints for  Finger extension to middle and ring fingers 15 minutes 1 time a day , cautioned about skin redness and skin intergrity, dad present to understand watching splint .       Home Exercises and Education Provided     Education provided:   - anatomy of hands flexion contractures     - Progress towards goals     Written Home Exercises Provided: Patient instructed to cont prior HEP.  Recommended pt start more bilateral functional task , using nuts and bolts , tying shoes , threading string and beads use of hand gripper , clothespins for pinch       Exercises were reviewed and Jonas was able to demonstrate them prior to the end of the session.  Jonas demonstrated fair  understanding of the education provided.         Assessment     Pt would continue to benefit from skilled OT. Yes      Jonas is progressing well towards his goals and there are no updates to goals at this time. Pt prognosis is Good.     Pt will continue to benefit from skilled outpatient occupational therapy to address the deficits listed in the problem list on initial evaluation provide pt/family education and to maximize pt's level of independence in the home and community environment.     Anticipated barriers to occupational therapy:  Bipolar , mood disorders     Pt's spiritual, cultural and educational needs considered and pt agreeable to plan of care and goals.      GOALS: 6 weeks. Pt agrees with goals set.       Short Term (4 weeks on 4/6/19):  1)   Patient to be IND with HEP and modalities for pain management, progressing   2)   Increase ROM 10 degrees  For  Fingers Mp extension left hand  motion to increase functional hand use for left hand , progressing         Long Term (by discharge):  1)   Pt will report 0 out of 10 pain with left hand ., progressing   2)   Patient to score of CJ on FOTO to demonstrate improved perception of functionalleft hand/ arm  Use.  Progressing   3)   Pt will return to prior level of function for ADLs and household management, progressing               Plan   Certification Period/Plan of care expiration: 3/6/2019 to 5/30/19 .     Outpatient Occupational Therapy 1 times weekly for 6 weeks may include the following interventions: Paraffin, Therapeutic exercises/activities. and Orthotic Fabrication/Fit/Training.          Jes Brown, OT

## 2019-04-18 ENCOUNTER — CLINICAL SUPPORT (OUTPATIENT)
Dept: REHABILITATION | Facility: HOSPITAL | Age: 34
End: 2019-04-18
Payer: MEDICAID

## 2019-04-18 DIAGNOSIS — M24.542 CONTRACTURE OF JOINT OF FINGER OF LEFT HAND: Primary | ICD-10-CM

## 2019-04-18 PROCEDURE — 97110 THERAPEUTIC EXERCISES: CPT

## 2019-04-18 NOTE — PROGRESS NOTES
Occupational Therapy Daily Treatment Note   Date 4/18/2019  Name: Robert Callahan  Clinic Number: [id number]     Therapy Diagnosis:        Encounter Diagnosis   Name Primary?    Contracture of joint of finger of left hand Yes      Physician: Yaroshevsky, Severyn, MD     Physician Orders: eval and treat   Medical Diagnosis: M72.0 dupuytren's contracture   Surgical Procedure/ Date :none   Evaluation Date: 3/6/2019  Insurance:  Medicaid   Insurance Authorization period Expiration: 2/14/2020  Plan of Care Certification Period: 4/30/19     Visit # / Visits Authortized:8/12per computer   Time In:5:00  Time Out:6:00  pm   Total Billable Time: 60 minutes     Precautions: Standard, bipolar disorder        Subjective     Pt 's dad reported that his has been able to wear the splint without any problems .   . Pt's dad present and helped answer questions, pt with flat affect throughout session.    Involved Side:  left  Dominant Side: Right  Date of Onset:  8 months ago   Mechanism of Injury: unknown, pt reports he had been keeping his hand fisted for the last 8 months.   History of Current Condition: left hand fisted flexion contracture , mainly middle , ring and little fingers         Pt reports: he was compliant with home exercise program given last session.   Response to previous treatment:able to wear splint 5 years no problem   Functional change: reports pt does like to draw    Pain: 1/10  Location: left fingers      Objective     AROM 3/28/19   Index Middle Ring  Little   0/70 5/80 5/80 -30/80  0/100 -50/100 -50/100 -30/90  0/40 +10/0 +10/0  +20/50    AROM 4/18/ 19   Index Middle Ring  Little   0/85 5/80 5/80 -30/80  0/100 -20/100 -20/100 -10/90  0/40 +10/0 +10/0  +20/50        Jonas received the following supervised modalities after being cleared for contradictions for 10 MH  minutes:     Jonas received the following manual therapy techniques for 30  minutes: PROM stretching   Hands ,      TA: use of left hand to  velcro checkers finger extension table top, yellow finger extensor exerciser and , tried isopheres , however pt unable to performon this date     Attempted us eof LMB finger splints for  Finger extension to middle and ring fingers 15 minutes 1 time a day , cautioned about skin redness and skin intergrity, dad present to understand watching splint .       Home Exercises and Education Provided     Education provided:   - anatomy of hands flexion contractures     - Progress towards goals     Written Home Exercises Provided: Patient instructed to cont prior HEP.  Recommended pt start more bilateral functional task , using nuts and bolts , tying shoes , threading string and beads use of hand gripper , clothespins for pinch       Exercises were reviewed and Jonas was able to demonstrate them prior to the end of the session.  Jonas demonstrated fair  understanding of the education provided.         Assessment     Pt would continue to benefit from skilled OT. Yes      Jonas is progressing well towards his goals and there are no updates to goals at this time. Pt prognosis is Good.     Pt will continue to benefit from skilled outpatient occupational therapy to address the deficits listed in the problem list on initial evaluation provide pt/family education and to maximize pt's level of independence in the home and community environment.     Anticipated barriers to occupational therapy:  Bipolar , mood disorders     Pt's spiritual, cultural and educational needs considered and pt agreeable to plan of care and goals.      GOALS: 6 weeks. Pt agrees with goals set.       Short Term (4 weeks on 4/6/19):  1)   Patient to be IND with HEP and modalities for pain management, progressing   2)   Increase ROM 10 degrees  For  Fingers Mp extension left hand  motion to increase functional hand use for left hand , progressing         Long Term (by discharge):  1)   Pt will report 0 out of 10 pain with left hand  ., progressing   2)   Patient to score of CJ on FOTO to demonstrate improved perception of functionalleft hand/ arm  Use. Progressing   3)   Pt will return to prior level of function for ADLs and household management, progressing               Plan   Certification Period/Plan of care expiration: 3/6/2019 to 5/30/19 .     Outpatient Occupational Therapy 1 times weekly for 6 weeks may include the following interventions: Paraffin, Therapeutic exercises/activities. and Orthotic Fabrication/Fit/Training.          Jes Brown, OT

## 2019-04-25 ENCOUNTER — CLINICAL SUPPORT (OUTPATIENT)
Dept: REHABILITATION | Facility: HOSPITAL | Age: 34
End: 2019-04-25
Payer: MEDICAID

## 2019-04-25 DIAGNOSIS — M24.542 CONTRACTURE OF JOINT OF FINGER OF LEFT HAND: Primary | ICD-10-CM

## 2019-04-25 PROCEDURE — 97110 THERAPEUTIC EXERCISES: CPT

## 2019-04-25 NOTE — PROGRESS NOTES
Occupational Therapy Daily Treatment Note   Date 4/25/2019  Name: Robert Callahan  Clinic Number: [id number]     Therapy Diagnosis:        Encounter Diagnosis   Name Primary?    Contracture of joint of finger of left hand Yes      Physician: Yaroshevsky, Severyn, MD     Physician Orders: eval and treat   Medical Diagnosis: M72.0 dupuytren's contracture   Surgical Procedure/ Date :none   Evaluation Date: 3/6/2019  Insurance:  Medicaid   Insurance Authorization period Expiration: 2/14/2020  Plan of Care Certification Period: 4/30/19     Visit # / Visits Authortized:8/12per computer   Time In:5:00  Time Out:6:00  pm   Total Billable Time: 60 minutes     Precautions: Standard, bipolar disorder        Subjective     Pt 's dad reported that his has been able to wear the splint without any problems .   . Pt's dad present and helped answer questions, pt with flat affect throughout session.    Involved Side:  left  Dominant Side: Right  Date of Onset:  8 months ago   Mechanism of Injury: unknown, pt reports he had been keeping his hand fisted for the last 8 months.   History of Current Condition: left hand fisted flexion contracture , mainly middle , ring and little fingers         Pt reports: he was compliant with home exercise program given last session.   Response to previous treatment:able to wear splint 5 years no problem   Functional change: reports pt does like to draw    Pain: 1/10  Location: left fingers      Objective     AROM 3/28/19   Index Middle Ring  Little   0/70 5/80 5/80 -30/80  0/100 -50/100 -50/100 -30/90  0/40 +10/0 +10/0  +20/50    AROM 4/18/ 19   Index Middle Ring  Little   0/85 5/80 5/80 -30/80  0/100 -20/100 -20/100 -10/90  0/40 +10/0 +10/0  +20/50        Jonas received the following supervised modalities after being cleared for contradictions for 10 MH  minutes:     Jonas received the following manual therapy techniques for 30  minutes: PROM stretching   Hands ,      TA: use of left hand to  velcro checkers finger extension table top, yellow finger extensor exerciser and , tried isopheres , however pt unable to performon this date      fabricated custom figure 8 to prevent further hyper extension of DIp joints.     Attempted us eof LMB finger splints for  Finger extension to middle and ring fingers 15 minutes 1 time a day , cautioned about skin redness and skin intergrity, dad present to understand watching splint .       Home Exercises and Education Provided     Education provided:   - anatomy of hands flexion contractures     - Progress towards goals     Written Home Exercises Provided: Patient instructed to cont prior HEP.  Recommended pt start more bilateral functional task , using nuts and bolts , tying shoes , threading string and beads use of hand gripper , clothespins for pinch       Exercises were reviewed and Jonas was able to demonstrate them prior to the end of the session.  Jonas demonstrated fair  understanding of the education provided.         Assessment     Pt would continue to benefit from skilled OT. Yes      Jonas is progressing well towards his goals and there are no updates to goals at this time. Pt prognosis is Good.     Pt will continue to benefit from skilled outpatient occupational therapy to address the deficits listed in the problem list on initial evaluation provide pt/family education and to maximize pt's level of independence in the home and community environment.     Anticipated barriers to occupational therapy:  Bipolar , mood disorders     Pt's spiritual, cultural and educational needs considered and pt agreeable to plan of care and goals.      GOALS: 6 weeks. Pt agrees with goals set.       Short Term (4 weeks on 4/6/19):  1)   Patient to be IND with HEP and modalities for pain management, progressing   2)   Increase ROM 10 degrees  For  Fingers Mp extension left hand  motion to increase functional hand use for left hand , progressing          Long Term (by discharge):  1)   Pt will report 0 out of 10 pain with left hand ., progressing   2)   Patient to score of CJ on FOTO to demonstrate improved perception of functionalleft hand/ arm  Use. Progressing   3)   Pt will return to prior level of function for ADLs and household management, progressing               Plan   Certification Period/Plan of care expiration: 3/6/2019 to 5/30/19 .     Outpatient Occupational Therapy 1 times weekly for 6 weeks may include the following interventions: Paraffin, Therapeutic exercises/activities. and Orthotic Fabrication/Fit/Training.          Jes Brown, OT

## 2019-04-30 ENCOUNTER — CLINICAL SUPPORT (OUTPATIENT)
Dept: REHABILITATION | Facility: HOSPITAL | Age: 34
End: 2019-04-30
Payer: MEDICAID

## 2019-04-30 DIAGNOSIS — M24.542 CONTRACTURE OF JOINT OF FINGER OF LEFT HAND: Primary | ICD-10-CM

## 2019-04-30 PROCEDURE — 97110 THERAPEUTIC EXERCISES: CPT

## 2019-04-30 NOTE — PROGRESS NOTES
Occupational Therapy Daily Treatment Note   Date 4/30/2019  Name: Robert Callahan  Clinic Number: [id number]     Therapy Diagnosis:        Encounter Diagnosis   Name Primary?    Contracture of joint of finger of left hand Yes      Physician: Yaroshevsky, Severyn, MD     Physician Orders: eval and treat   Medical Diagnosis: M72.0 dupuytren's contracture   Surgical Procedure/ Date :none   Evaluation Date: 3/6/2019  Insurance:  Medicaid   Insurance Authorization period Expiration: 2/14/2020  Plan of Care Certification Period: 4/30/19     Visit # / Visits Authortized:8/12per computer   Time In:5:00  Time Out:6:00  pm   Total Billable Time: 60 minutes     Precautions: Standard, bipolar disorder        Subjective     Pt 's dad reported that his has been able to wear the splint without any problems . Pt reports that he is now playing the guitar for one hour a day , this is something he has not be able to do in 3 years   Pt is smiling , engaging in more conversation .  .     Involved Side:  left  Dominant Side: Right  Date of Onset:  8 months ago   Mechanism of Injury: unknown, pt reports he had been keeping his hand fisted for the last 8 months.   History of Current Condition: left hand fisted flexion contracture , mainly middle , ring and little fingers         Pt reports: he was compliant with home exercise program given last session.   Response to previous treatment:able to wear splint 5 years no problem   Functional change: reports pt does like to draw    Pain: 1/10  Location: left fingers      Objective     AROM 3/28/19   Index Middle Ring  Little   0/70 5/80 5/80 -30/80  0/100 -50/100 -50/100 -30/90  0/40 +10/0 +10/0  +20/50    AROM 4/18/ 19   Index Middle Ring  Little   0/85 5/80 5/80 -30/80  0/100 -20/100 -20/100 -10/90  0/40 +10/0 +10/0  +20/50        Jonas received the following supervised modalities after being cleared for contradictions for 10 MH  minutes:     Jonas received the  following manual therapy techniques for 30  minutes: PROM stretching   Hands ,     TA: use of left hand to  velcro checkers finger extension table top, yellow finger extensor exerciser and , tried isopheres , however pt unable to performon this date   Red clothespin for tripod pinching   custom figure 8 appear to be working the best to prevent further hyper extension. l    Attempted us eof LMB finger splints for  Finger extension to middle and ring fingers 15 minutes 1 time a day , cautioned about skin redness and skin intergrity, dad present to understand watching splint .       Home Exercises and Education Provided     Education provided:   - anatomy of hands flexion contractures     - Progress towards goals     Written Home Exercises Provided: Patient instructed to cont prior HEP.  Recommended pt start more bilateral functional task , using nuts and bolts , tying shoes , threading string and beads use of hand gripper , clothespins for pinch       Exercises were reviewed and Jonas was able to demonstrate them prior to the end of the session.  Jonas demonstrated fair  understanding of the education provided.         Assessment     Pt would continue to benefit from skilled OT. Yes      Jonas is progressing well towards his goals and there are no updates to goals at this time. Pt prognosis is Good.     Pt will continue to benefit from skilled outpatient occupational therapy to address the deficits listed in the problem list on initial evaluation provide pt/family education and to maximize pt's level of independence in the home and community environment.     Anticipated barriers to occupational therapy:  Bipolar , mood disorders     Pt's spiritual, cultural and educational needs considered and pt agreeable to plan of care and goals.      GOALS: 6 weeks. Pt agrees with goals set.       Short Term (4 weeks on 4/6/19):  1)   Patient to be IND with HEP and modalities for pain management, progressing   2)   Increase ROM  10 degrees  For  Fingers Mp extension left hand  motion to increase functional hand use for left hand , progressing         Long Term (by discharge):  1)   Pt will report 0 out of 10 pain with left hand ., progressing   2)   Patient to score of CJ on FOTO to demonstrate improved perception of functionalleft hand/ arm  Use. Progressing   3)   Pt will return to prior level of function for ADLs and household management, progressing               Plan   Certification Period/Plan of care expiration: 3/6/2019 to 5/30/19 .     Outpatient Occupational Therapy 1 times weekly for 6 weeks may include the following interventions: Paraffin, Therapeutic exercises/activities. and Orthotic Fabrication/Fit/Training.          Jes Brown, OT

## 2019-05-16 ENCOUNTER — CLINICAL SUPPORT (OUTPATIENT)
Dept: REHABILITATION | Facility: HOSPITAL | Age: 34
End: 2019-05-16
Payer: MEDICAID

## 2019-05-16 DIAGNOSIS — M24.542 CONTRACTURE OF JOINT OF FINGER OF LEFT HAND: Primary | ICD-10-CM

## 2019-05-16 PROCEDURE — 97110 THERAPEUTIC EXERCISES: CPT

## 2019-05-16 NOTE — PROGRESS NOTES
Occupational Therapy Daily Treatment Note   Date 5/16/2019  Name: Robert Callahan  Clinic Number: [id number]     Therapy Diagnosis:        Encounter Diagnosis   Name Primary?    Contracture of joint of finger of left hand Yes      Physician: Yaroshevsky, Severyn, MD     Physician Orders: eval and treat   Medical Diagnosis: M72.0 dupuytren's contracture   Surgical Procedure/ Date :none   Evaluation Date: 3/6/2019  Insurance:  Medicaid   Insurance Authorization period Expiration: 2/14/2020  Plan of Care Certification Period: 4/30/19     Visit # / Visits Authortized:8/12per computer   Time In:5:00  Time Out:6:00  pm   Total Billable Time: 60 minutes     Precautions: Standard, bipolar disorder        Subjective     Pt 's dad reported that his has been able to wear the splint without any problems . Pt reports that he is now playing the guitar for one hour a day , this is something he has not be able to do in 3 years   Pt is smiling , engaging in more conversation .  .     Involved Side:  left  Dominant Side: Right  Date of Onset:  8 months ago   Mechanism of Injury: unknown, pt reports he had been keeping his hand fisted for the last 8 months.   History of Current Condition: left hand fisted flexion contracture , mainly middle , ring and little fingers         Pt reports: he was compliant with home exercise program given last session.   Response to previous treatment:able to wear splint 5 years no problem   Functional change: reports pt does like to draw    Pain: 1/10  Location: left fingers      Objective     AROM 3/28/19   Index Middle Ring  Little   0/70 5/80 5/80 -30/80  0/100 -50/100 -50/100 -30/90  0/40 +10/0 +10/0  +20/50    AROM 4/18/ 19   Index Middle Ring  Little   0/85 5/80 5/80 -30/80  0/100 -20/100 -20/100 -10/90  0/40 +10/0 +10/0  +20/50        Jonas received the following supervised modalities after being cleared for contradictions for 10 MH  minutes:     Jonas received the  following manual therapy techniques for 30  minutes: PROM stretching   Hands ,     TE discussed strengthening program and pt performed the following exercises    hand gripper 20# 2 minutes   Wrist flexion 2# 30 reps    wrist ext 2# 30 reps  Pronation sup 1# hammer 30 reps   Weight well 1/2 # 2 minutes    pt instructed to wear oval 8 splints daily with activities and to wear resting hand only as needed at night .      Pt and patients dad verbalized and demonstrated understanding with above exercises be part of home Ex program .         Home Exercises and Education Provided     Education provided:   - anatomy of hands flexion contractures     - Progress towards goals     Written Home Exercises Provided: Patient instructed to cont prior HEP.  Recommended pt start more bilateral functional task , using nuts and bolts , tying shoes , threading string and beads use of hand gripper , clothespins for pinch       Exercises were reviewed and Jonas was able to demonstrate them prior to the end of the session.  Jonas demonstrated fair  understanding of the education provided.         Assessment     Pt would continue to benefit from skilled OT. Yes, discussed one more visit and then to probably consider d/c     Jonas is progressing well towards his goals and there are no updates to goals at this time. Pt prognosis is Good.     Pt will continue to benefit from skilled outpatient occupational therapy to address the deficits listed in the problem list on initial evaluation provide pt/family education and to maximize pt's level of independence in the home and community environment.     Anticipated barriers to occupational therapy:  Bipolar , mood disorders     Pt's spiritual, cultural and educational needs considered and pt agreeable to plan of care and goals.      GOALS: 6 weeks. Pt agrees with goals set.       Short Term (4 weeks on 4/6/19):  1)   Patient to be IND with HEP and modalities for pain management, progressing   2)    Increase ROM 10 degrees  For  Fingers Mp extension left hand  motion to increase functional hand use for left hand , progressing         Long Term (by discharge):  1)   Pt will report 0 out of 10 pain with left hand ., progressing   2)   Patient to score of CJ on FOTO to demonstrate improved perception of functionalleft hand/ arm  Use. Progressing   3)   Pt will return to prior level of function for ADLs and household management, progressing               Plan   Certification Period/Plan of care expiration: 5/16/19 to 6/30/19 .     Outpatient Occupational Therapy 1 times weekly for 6 weeks may include the following interventions: Paraffin, Therapeutic exercises/activities. and Orthotic Fabrication/Fit/Training.          Jes Brown, OT

## 2019-06-06 ENCOUNTER — CLINICAL SUPPORT (OUTPATIENT)
Dept: REHABILITATION | Facility: HOSPITAL | Age: 34
End: 2019-06-06
Payer: MEDICAID

## 2019-06-06 DIAGNOSIS — M24.542 CONTRACTURE OF JOINT OF FINGER OF LEFT HAND: Primary | ICD-10-CM

## 2019-06-06 PROCEDURE — 97110 THERAPEUTIC EXERCISES: CPT

## 2019-06-06 NOTE — PROGRESS NOTES
Occupational Therapy Daily Treatment Note   Date 6/6/2019  Name: Robert Callahan  Clinic Number: [id number]     Therapy Diagnosis:        Encounter Diagnosis   Name Primary?    Contracture of joint of finger of left hand Yes      Physician: Yaroshevsky, Severyn, MD     Physician Orders: eval and treat   Medical Diagnosis: M72.0 dupuytren's contracture   Surgical Procedure/ Date :none   Evaluation Date: 3/6/2019  Insurance:  Medicaid   Insurance Authorization period Expiration: 2/14/2020  Plan of Care Certification Period: 4/30/19     Visit # / Visits Authortized:9/12per computer   Time In:5:00  Time Out:6:00  pm   Total Billable Time: 60 minutes     Precautions: Standard, bipolar disorder        Subjective     Pt 's dad reported that his has been able to wear the splint without any problems . Pt reports that he is now playing the [employer] for one hour a day , this is something he has not be able to do in 3 years   Pt is smiling , engaging in more conversation . Today on patients last visit he reports that he wants to send a video of himself playing the  [employer] .    Involved Side:  left  Dominant Side: Right  Date of Onset:  8 months ago   Mechanism of Injury: unknown, pt reports he had been keeping his hand fisted for the last 8 months.   History of Current Condition: left hand fisted flexion contracture , mainly middle , ring and little fingers         Pt reports: he was compliant with home exercise program given last session.   Response to previous treatment:able to wear splint 5 years no problem   Functional change: reports pt does like to draw    Pain: 1/10  Location: left fingers      Objective     AROM 3/28/19   Index Middle Ring  Little   0/70 5/80 5/80 -30/80  0/100 -50/100 -50/100 -30/90  0/40 +10/0 +10/0  +20/50    AROM 4/18/ 19   Index Middle Ring  Little   0/85 5/80 5/80 -30/80  0/100 -20/100 -20/100 -10/90  0/40 +10/0 +10/0  +20/50    AROM 6/6/19  Index Middle   Ring     Little   0/85 0/80 0/85 -5/80  0/100 -20/100 -20/100 -5/90  0/40 +10/0 +10/0 +20/50     Strength   Left 40#, 40#, 40#   right  60#, 60#,55#     TE discussed strengthening program and pt performed the following exercises    hand gripper 20# 2 minutes   Wrist flexion 2# 30 reps    wrist ext 2# 30 reps  Pronation sup 1# hammer 30 reps   Weight well 1/2 # 2 minutes      Pt instructed to wear oval 8 splints daily with activities and to wear resting hand only as needed at night . Discussed with his dad that long term they may want to purchase silver ring splints for middle and ring fingers to prevent further DIP hyperextension       Pt and patients dad verbalized and demonstrated understanding with above exercises be part of home Ex program .         Home Exercises and Education Provided     Education provided:   - anatomy of hands flexion contractures     - Progress towards goals     Written Home Exercises Provided: Patient instructed to cont prior HEP.  Recommended pt start more bilateral functional task , using nuts and bolts , tying shoes , threading string and beads use of hand gripper , clothespins for pinch       Exercises were reviewed and Jonas was able to demonstrate them prior to the end of the session.  Jonas demonstrated fair  understanding of the education provided.         Assessment     Pt has made significant progress with ROM and increased function and use of left hand to return to normal task and includes playing the guitar. Jonas achieved maxium gains progressing well towards his goals and there are no updates to goals at this time. Pt prognosis is Good.     Pt will continue to benefit from skilled outpatient occupational therapy to address the deficits listed in the problem list on initial evaluation provide pt/family education and to maximize pt's level of independence in the home and community environment.     Anticipated barriers to occupational therapy:  Bipolar , mood disorders     Pt's  spiritual, cultural and educational needs considered and pt agreeable to plan of care and goals.      GOALS: 6 weeks. Pt agrees with goals set.       Short Term (4 weeks on 4/6/19):  1)   Patient to be IND with HEP and modalities for pain management,   Met 6/6/19   2)   Increase ROM 10 degrees  For  Fingers Mp extension left hand  motion to increase functional hand use for left hand ,  farhana 6/6/19      Long Term (by discharge):  1)   Pt will report 0 out of 10 pain with left hand . Met 6/6/19    2)   Patient to score of CJ on FOTO to demonstrate improved perception of functionalleft hand/ arm  Use. Progressing   3)   Pt will return to prior level of function for ADLs and household management,  Met 6/6/ 19               Plan   Certification Period/Plan of care expiration: 5/16/19 to 6/30/19 .    pt to be d/c from OT at this time         Jes Brown OT

## 2019-10-15 ENCOUNTER — OFFICE VISIT (OUTPATIENT)
Dept: FAMILY MEDICINE | Facility: HOSPITAL | Age: 34
End: 2019-10-15
Attending: FAMILY MEDICINE
Payer: MEDICAID

## 2019-10-15 VITALS
SYSTOLIC BLOOD PRESSURE: 131 MMHG | HEART RATE: 104 BPM | DIASTOLIC BLOOD PRESSURE: 93 MMHG | HEIGHT: 70 IN | WEIGHT: 211 LBS | BODY MASS INDEX: 30.21 KG/M2

## 2019-10-15 DIAGNOSIS — R74.8 ELEVATED LIVER ENZYMES: ICD-10-CM

## 2019-10-15 DIAGNOSIS — R73.9 HYPERGLYCEMIA WITHOUT KETOSIS: Primary | ICD-10-CM

## 2019-10-15 PROCEDURE — 99213 OFFICE O/P EST LOW 20 MIN: CPT | Performed by: STUDENT IN AN ORGANIZED HEALTH CARE EDUCATION/TRAINING PROGRAM

## 2019-10-15 RX ORDER — AMOXICILLIN 500 MG/1
TABLET, FILM COATED ORAL
Refills: 0 | COMMUNITY
Start: 2019-10-11

## 2019-10-16 NOTE — PROGRESS NOTES
"Subjective:       Patient ID: Robert Callahan is a 33 y.o. male.    Chief Complaint: follow up of left hand contracture     HPI   Pt is a 34 yo M w/ pmhx of bipolar disorder, left hand contracture presenting for follow up of his left hand contracture. Pt has been doing well regarding his left hand contracture. Pt completed occupational therapy of his left hand. Pt states he has "80%" function of his left hand. He is now playing guitar. Pt can fully extend all the fingers of the left hand. Pt has no complaints about the left hand.    Pt is followed by Dr. Brush who prescribes olanzapine, venlafaxine for his bipolar disorder and schizophrenia. Pt recently had lab draws and father is here with him concerned about the lab results. He has a copy, which has been scanned into the chart, that showed blood glucose 399 and elevated alk phos , AST. Pt was started on olanzapine over a year ago. Pt father states he eats "healthy" but then states he could possibly have some processed foods and sugars in his diet. Pt does not get adequate cardiovascular exercise weekly. Pt denies hallucinations, SI, HI, depression. Pt denies polydipsia, polyphagia, polyuria, urinary frequency, blurred vision, headaches.     Review of Systems   Constitutional: Negative for chills, diaphoresis, fatigue and fever.   HENT: Negative for congestion, rhinorrhea, sore throat and trouble swallowing.    Eyes: Negative for itching and visual disturbance.   Respiratory: Negative for cough, chest tightness, shortness of breath and wheezing.    Cardiovascular: Negative for chest pain and palpitations.   Gastrointestinal: Negative for abdominal pain, constipation, diarrhea, nausea and vomiting.   Endocrine: Negative for polyphagia and polyuria.   Genitourinary: Negative for dysuria and flank pain.   Musculoskeletal: Negative for back pain, joint swelling and neck stiffness.   Skin: Negative for color change and rash.   Neurological: Negative for " dizziness, weakness, light-headedness and headaches.   Psychiatric/Behavioral: Negative for confusion. The patient is not nervous/anxious.        Objective:      Vitals:    10/15/19 1617   BP: (!) 131/93   Pulse: 104     Physical Exam   Constitutional: He is oriented to person, place, and time. He appears well-developed and well-nourished. No distress.   obese   HENT:   Head: Normocephalic and atraumatic.   Mouth/Throat: Oropharynx is clear and moist.   Eyes: Pupils are equal, round, and reactive to light. Conjunctivae and EOM are normal.   Neck: Normal range of motion. Neck supple.   Cardiovascular: Normal rate, regular rhythm, normal heart sounds and intact distal pulses.   Pulmonary/Chest: Effort normal.   Abdominal: Soft. Bowel sounds are normal.   Musculoskeletal: Normal range of motion.   Full extension of the left fingers  5/5  strength of the left hand  Neurovascularly intact to the left hand   Neurological: He is alert and oriented to person, place, and time.   Skin: Skin is dry. Capillary refill takes less than 2 seconds. He is not diaphoretic.   Psychiatric: His speech is normal and behavior is normal. Thought content normal. His affect is blunt.   Vitals reviewed.      Assessment:       1. Hyperglycemia without ketosis    2. Elevated liver enzymes        Plan:       Elevated liver enzymes  -     US Abdomen Limited; Future; Expected date: 10/15/2019   -Counseled pt that elevated liver enzymes could be a side effect of medications he is taking as well as fatty liver amongst other causes    Hyperglycemia without ketosis   - Ordered hgA1c given BG of 399 on 10/4/19   -Repeat lipid panel   -Pt uses [facility] so lab results will need to be scanned into chart   -Counseled pt that hyperglycemia could be caused by antipyschotics as well as his diet and exercise   -Pt will start diet and exercise.      Follow up in about 1 month (around 11/15/2019).      Andres Venegas DO HO-1  [facility]

## 2019-10-17 NOTE — PROGRESS NOTES
I have reviewed the notes, assessments, and/or procedures performed by Dr. Venegas, I concur with her/his documentation of Robert Callahan.

## 2019-10-28 ENCOUNTER — HOSPITAL ENCOUNTER (OUTPATIENT)
Dept: RADIOLOGY | Facility: HOSPITAL | Age: 34
Discharge: HOME | End: 2019-10-28
Attending: STUDENT IN AN ORGANIZED HEALTH CARE EDUCATION/TRAINING PROGRAM
Payer: MEDICAID

## 2019-10-28 DIAGNOSIS — R74.8 ELEVATED LIVER ENZYMES: ICD-10-CM

## 2019-10-28 PROCEDURE — 76705 US ABDOMEN LIMITED: ICD-10-PCS | Mod: 26,,, | Performed by: RADIOLOGY

## 2019-10-28 PROCEDURE — 76705 ECHO EXAM OF ABDOMEN: CPT | Mod: TC

## 2019-10-28 PROCEDURE — 76705 ECHO EXAM OF ABDOMEN: CPT | Mod: 26,,, | Performed by: RADIOLOGY

## 2019-10-31 DIAGNOSIS — E11.65 TYPE 2 DIABETES MELLITUS WITH HYPERGLYCEMIA, WITHOUT LONG-TERM CURRENT USE OF INSULIN: Primary | ICD-10-CM

## 2019-10-31 RX ORDER — METFORMIN HYDROCHLORIDE 500 MG/1
500 TABLET ORAL 2 TIMES DAILY WITH MEALS
Qty: 180 TABLET | Refills: 3 | Status: SHIPPED | OUTPATIENT
Start: 2019-10-31 | End: 2020-11-12 | Stop reason: SDUPTHER

## 2019-10-31 RX ORDER — GLIPIZIDE 2.5 MG/1
2.5 TABLET, EXTENDED RELEASE ORAL
Qty: 90 TABLET | Refills: 3 | Status: SHIPPED | OUTPATIENT
Start: 2019-10-31 | End: 2020-03-06

## 2019-11-04 ENCOUNTER — TELEPHONE (OUTPATIENT)
Dept: FAMILY MEDICINE | Facility: HOSPITAL | Age: 34
End: 2019-11-04

## 2019-11-04 NOTE — TELEPHONE ENCOUNTER
----- Message from Joana Blue sent at 11/4/2019 10:20 AM CST -----  PT would like to know if it ok for him to take metFORMIN (GLUCOPHAGE) 500 MG tablet so close togother. Direction say twice a day but needs to know if he takes it at 2pm can he take the next dose at 4pm again?

## 2019-11-11 ENCOUNTER — CLINICAL SUPPORT (OUTPATIENT)
Dept: DIABETES | Facility: CLINIC | Age: 34
End: 2019-11-11
Payer: MEDICAID

## 2019-11-11 DIAGNOSIS — E11.65 TYPE 2 DIABETES MELLITUS WITH HYPERGLYCEMIA, WITHOUT LONG-TERM CURRENT USE OF INSULIN: ICD-10-CM

## 2019-11-11 PROCEDURE — 99999 PR PBB SHADOW E&M-EST. PATIENT-LVL II: CPT | Mod: PBBFAC,,,

## 2019-11-11 PROCEDURE — G0108 DIAB MANAGE TRN  PER INDIV: HCPCS | Mod: PBBFAC,PO | Performed by: REGISTERED NURSE

## 2019-11-11 PROCEDURE — 99999 PR PBB SHADOW E&M-EST. PATIENT-LVL II: ICD-10-PCS | Mod: PBBFAC,,,

## 2019-11-11 PROCEDURE — 99212 OFFICE O/P EST SF 10 MIN: CPT | Mod: PBBFAC,PO | Performed by: REGISTERED NURSE

## 2019-11-12 ENCOUNTER — TELEPHONE (OUTPATIENT)
Dept: FAMILY MEDICINE | Facility: HOSPITAL | Age: 34
End: 2019-11-12

## 2019-11-13 VITALS — BODY MASS INDEX: 29.13 KG/M2 | WEIGHT: 203 LBS

## 2019-11-13 NOTE — TELEPHONE ENCOUNTER
----- Message from Katarina Smith MA sent at 11/12/2019  1:03 PM CST -----  Patient requesting one touch verio flex machine, lancets and test strips.  Please send to [facility] on [address].  Thanks.

## 2019-11-13 NOTE — PROGRESS NOTES
Diabetes Education  Author: Lindsey Burton RN  Date: 11/13/2019    Diabetes Care Management Summary  Diabetes Education Record Assessment/Progress: Initial  Current Diabetes Risk Level: High   Last A1c: No results found for: HGBA1C  Last Visit with Diabetes Educator: : 11/11/2019  Last OPCM Referral: : Not Found   Enrolled in OPCM: No  Diabetes Type  Diabetes Type : Type II  Diabetes History  Diabetes Diagnosis: 0-1 year  Current Treatment: Oral Medication, Diet, Exercise  Health Maintenance was reviewed today with patient. Discussed with patient importance of routine eye exams, foot exams/foot care, blood work (i.e.: A1c, microalbumin, and lipid), dental visits, yearly flu vaccine, and pneumonia vaccine as indicated by PCP. Patient verbalized understanding.     The patient has no Health Maintenance topics of status Not Due  Health Maintenance Due   Topic Date Due    Lipid Panel  1985    Hemoglobin A1c  1985    Foot Exam  11/18/1995    Eye Exam  11/18/1995    Urine Microalbumin  11/18/1995    TETANUS VACCINE  11/18/2003    Pneumococcal Vaccine (Medium Risk) (1 of 1 - PPSV23) 11/18/2004   Nutrition  Meal Planning: skipping meals, water, eats out often, snacks between meal  What type of beverages do you drink?: juice, water  Meal Plan 24 Hour Recall - Breakfast: cheerios, 2 brekfast burritos  Meal Plan 24 Hour Recall - Lunch: nothing  Meal Plan 24 Hour Recall - Dinner: stuffed crab  Meal Plan 24 Hour Recall - Snack: trail mix  Monitoring   Self Monitoring : pt was given a one touch verio flex meter and instructed in its use. instructed pt to test once a day fasting in the am. instructed pt on the normal bs ranges. instructed pt to keep a record of his bs's and to bring the record to his md visits. pt will fu with md to get testing supplies ordered  Blood Glucose Logs: No  Do you use a personal continuous glucose monitor?: No  In the last month, how often have you had a low blood sugar reaction?:  never  Can you tell when your blood sugar is too high?: no  Exercise   Exercise Type: walking  Intensity: Low  Frequency: 3-5 Times per week  Duration: 15 min  Current Diabetes Treatment   Current Treatment: Oral Medication, Diet, Exercise  Social History  Preferred Learning Method: Face to Face, Group Education, Demonstration, Reading Materials  Primary Support: Family  Educational Level: GED  Smoking Status: Never a Smoker  Alcohol Use: Never    DDS-2 Score  ( > 3 = SIGNIFICANT DISTRESS): 2     Barriers to Change  Barriers to Change: None  Learning Challenges : None  Readiness to Learn   Readiness to Learn : Acceptance  Cultural Influences  Cultural Influences: None  Diabetes Education Assessment/Progress  Diabetes Disease Process (diabetes disease process and treatment options): Discussion, Instructed, Demonstrates Understanding/Competency(verbalizes/demonstrates), Individual Session, Written Materials Provided  Nutrition (Incorporating nutritional management into one's lifestyle): Discussion, Demonstration, Instructed, Demonstrates Understanding/Competency (verbalizes/demonstrates), Individual Session, Written Materials Provided  Physical Activity (incorporating physical activity into one's lifestyle): Discussion, Instructed, Demonstrates Understanding/Competency (verbalizes/demonstrates), Individual Session, Written Materials Provided  Medications (states correct name, dose, onset, peak, duration, side effects & timing of meds): Discussion, Instructed, Demonstrates Understanding/Competency(verbalizes/demonstrates), Individual Session, Written Materials Provided  Monitoring (monitoring blood glucose/other parameters & using results): Discussion, Instructed, Demonstrates Understanding/Competency (verbalizes/demonstrates), Individual Session, Written Materials Provided, Demonstration  Acute Complications (preventing, detecting, and treating acute complications): Discussion, Instructed, Demonstrates  Understanding/Competency (verbalizes/demonstrates), Individual Session, Written Materials Provided  Chronic Complications (preventing, detecting, and treating chronic complications): Discussion, Instructed, Demonstrates Understanding/Competency (verbalizes/demonstrates), Individual Session, Written Materials Provided  Clinical (diabetes, other pertinent medical history, and relevant comorbidities reviewed during visit): Discussion, Demonstrates Understanding/Competency (verbalizes/demonstrates), Instructed  Cognitive (knowledge of self-management skills, functional health literacy): Discussion, Instructed, Demonstrates Understanding/Competency (verbalizes/demonstrates), Individual Session  Psychosocial (emotional response to diabetes): Discussion, Instructed, Demonstrates Understanding/Competency (verbalizes/demonstrates), Individual Session  Diabetes Distress and Support Systems: Discussion, Instructed, Demonstrates Understanding/Competency (verbalizes/demonstrates), Individual Session  Behavioral (readiness for change, lifestyle practices, self-care behaviors): Discussion, Instructed, Demonstrates Understanding/Competency (verbalizes/demonstrates), Individual Session  Goals  Patient has selected/evaluated goals during today's session: Yes, selected  Healthy Eating: Set  Start Date: 11/11/19  Target Date: 01/13/20  Monitoring: Set  Start Date: 11/11/19  Target Date: 01/13/20  Diabetes Care Plan/Intervention  Education Plan/Intervention: Group Follow-Up DSMT, Eye Exam, Foot Exam  Diabetes Meal Plan  Restrictions: Restricted Carbohydrate  Calories: 1800  Carbohydrate Per Meal: 45-60g  Carbohydrate Per Snack : 15-20g  Instructed pt on the food groups, how to read labels and count carbs. Pt was given sample menus and meal plans as examples. Pts father present during visit. Pt usually skips lunch because he sleeps late in am due to the medication that he takes. Discussed fruit juice with pt and the fact that it has  carbs and needs to be counted in the carb count for the meal as well as the portion of juice which is important. Discussed with pt the importance of eating 3 balanced and portioned meals per day. Discussed with pt foods that he likes that he could include in his meal plan. Discussed with pt how to put his meals together. Discussed with pt the importance of portioning his foods. Pt had fair understanding of meal plan and it is questionable if he will participate in meal planning or just eat what is served to him. Pt was advised to call for any problems or questions.   Today's Self-Management Care Plan was developed with the patient's input and is based on barriers identified during today's assessment.    The long and short-term goals in the care plan were written with the patient/caregiver's input. The patient has agreed to work toward these goals to improve his overall diabetes control.      The patient received a copy of today's self-management plan and verbalized understanding of the care plan, goals, and all of today's instructions.      The patient was encouraged to communicate with his physician and care team regarding his condition(s) and treatment.  I provided the patient with my contact information today and encouraged him to contact me via phone or patient portal as needed.     Education Units of Time   Time Spent: 60 min

## 2019-11-14 RX ORDER — INSULIN PUMP SYRINGE, 3 ML
EACH MISCELLANEOUS
Qty: 1 EACH | Refills: 0 | Status: CANCELLED | OUTPATIENT
Start: 2019-11-14 | End: 2020-11-13

## 2019-11-15 ENCOUNTER — TELEPHONE (OUTPATIENT)
Dept: FAMILY MEDICINE | Facility: HOSPITAL | Age: 34
End: 2019-11-15

## 2019-11-15 DIAGNOSIS — R73.9 HYPERGLYCEMIA WITHOUT KETOSIS: Primary | ICD-10-CM

## 2019-11-15 RX ORDER — LANCETS
1 EACH MISCELLANEOUS
Qty: 200 EACH | Refills: 2 | Status: SHIPPED | OUTPATIENT
Start: 2019-11-15 | End: 2020-11-14

## 2019-11-15 NOTE — TELEPHONE ENCOUNTER
----- Message from Katarina Smith MA sent at 11/15/2019 10:24 AM CST -----  Requesting strips and lancets for One touch verio flex machine be sent to [facility] on [facility].  Second request.  Thanks.

## 2019-11-19 ENCOUNTER — OFFICE VISIT (OUTPATIENT)
Dept: FAMILY MEDICINE | Facility: HOSPITAL | Age: 34
End: 2019-11-19
Attending: FAMILY MEDICINE
Payer: MEDICAID

## 2019-11-19 VITALS
HEIGHT: 70 IN | SYSTOLIC BLOOD PRESSURE: 129 MMHG | WEIGHT: 209.44 LBS | DIASTOLIC BLOOD PRESSURE: 90 MMHG | BODY MASS INDEX: 29.98 KG/M2 | HEART RATE: 107 BPM

## 2019-11-19 DIAGNOSIS — E11.9 CONTROLLED TYPE 2 DIABETES MELLITUS WITHOUT COMPLICATION, WITHOUT LONG-TERM CURRENT USE OF INSULIN: Primary | ICD-10-CM

## 2019-11-19 PROCEDURE — 99213 OFFICE O/P EST LOW 20 MIN: CPT | Performed by: STUDENT IN AN ORGANIZED HEALTH CARE EDUCATION/TRAINING PROGRAM

## 2019-11-19 RX ORDER — SODIUM FLUORIDE/POTASSIUM NIT 1.1 %-5 %
PASTE (ML) DENTAL
Refills: 0 | COMMUNITY
Start: 2019-10-16

## 2019-11-19 RX ORDER — LANCETS 33 GAUGE
EACH MISCELLANEOUS
Refills: 2 | COMMUNITY
Start: 2019-11-17 | End: 2021-04-05 | Stop reason: SDUPTHER

## 2019-11-20 PROBLEM — F31.70 BIPOLAR AFFECTIVE DISORDER IN REMISSION: Status: ACTIVE | Noted: 2019-11-20

## 2019-11-20 PROBLEM — E66.811 OBESITY (BMI 30.0-34.9): Status: ACTIVE | Noted: 2019-11-20

## 2019-11-20 PROBLEM — E11.9 CONTROLLED TYPE 2 DIABETES MELLITUS WITHOUT COMPLICATION, WITHOUT LONG-TERM CURRENT USE OF INSULIN: Status: ACTIVE | Noted: 2019-11-20

## 2019-11-20 PROBLEM — E66.9 OBESITY (BMI 30.0-34.9): Status: ACTIVE | Noted: 2019-11-20

## 2019-11-20 NOTE — PROGRESS NOTES
Subjective:       Patient ID: Robert Callahan is a 34 y.o. male.    Chief Complaint: Follow-up for DMII    HPI   Pt is a 35 yo M w/ bipolar disorder, DMII, presenting for follow up of DMII. Pt has been doing well since last visit. Is taking metformin 500 bid and glipizide 2.5 qd. Pt states he has been exercising daily, was previously not exercising, now doing elliptical up to 7 mins/day. Is now on diabetic diet, with some days where he breaks diet. Pt father present, and states they attended a diabetic education class, which he thinks was beneficial. Pt is now checking blood glucose daily fasting AM. Has record and blood glucose  fasting in the AM for the past 15 days. Pt has no complaints today, denies symptoms of hyper/hypoglycemia.    Review of Systems   Constitutional: Negative for chills, diaphoresis, fatigue and fever.   HENT: Negative for congestion, rhinorrhea, sore throat and trouble swallowing.    Eyes: Negative for itching and visual disturbance.   Respiratory: Negative for cough, chest tightness, shortness of breath and wheezing.    Cardiovascular: Negative for chest pain and palpitations.   Gastrointestinal: Negative for abdominal pain, constipation, diarrhea, nausea and vomiting.   Endocrine: Negative for polyphagia and polyuria.   Genitourinary: Negative for dysuria and flank pain.   Musculoskeletal: Negative for back pain, joint swelling and neck stiffness.   Skin: Negative for color change and rash.   Neurological: Negative for dizziness, weakness, light-headedness and headaches.   Psychiatric/Behavioral: Negative for confusion. The patient is not nervous/anxious.        Objective:      Vitals:    11/19/19 1520   BP: (!) 129/90   Pulse: 107     Physical Exam   Constitutional: He is oriented to person, place, and time. He appears well-developed and well-nourished. No distress.   Obese  Perinasal/perioral acne   HENT:   Head: Normocephalic and atraumatic.   Mouth/Throat: Oropharynx is  clear and moist.   Eyes: Pupils are equal, round, and reactive to light. Conjunctivae and EOM are normal.   Neck: Normal range of motion. Neck supple.   Cardiovascular: Normal rate, regular rhythm, normal heart sounds and intact distal pulses.   Pulmonary/Chest: Effort normal.   Abdominal: Soft. Bowel sounds are normal.   Musculoskeletal: Normal range of motion.   Neurological: He is alert and oriented to person, place, and time.   Skin: Skin is dry. Capillary refill takes less than 2 seconds. He is not diaphoretic.   Psychiatric: He has a normal mood and affect. His behavior is normal.       Assessment:       1. Controlled type 2 diabetes mellitus without complication, without long-term current use of insulin        Plan:       Controlled type 2 diabetes mellitus without complication, without long-term current use of insulin  Comments:  Check BG q 2days. Continue diet/exercise. Will titrate up the metformin 750 BID if BG readings >130 fasting. Continue glipizide. Continue record BG. Pt is motivated to make lifestyle changes. Will recheck A1c at next visit.       Follow up in about 3 months (around 2/19/2020), or if symptoms worsen or fail to improve, for DMII.      Andres Venegas DO HO-1  [facility]

## 2019-12-03 NOTE — PROGRESS NOTES
I have reviewed the notes, assessments, and/or procedures performed by Dr Venegas, I concur with her/his documentation of Robert Callahan. I was physically present in the room with the patient.

## 2019-12-17 ENCOUNTER — CLINICAL SUPPORT (OUTPATIENT)
Dept: DIABETES | Facility: CLINIC | Age: 34
End: 2019-12-17
Payer: MEDICAID

## 2019-12-17 DIAGNOSIS — E11.9 CONTROLLED TYPE 2 DIABETES MELLITUS WITHOUT COMPLICATION, WITHOUT LONG-TERM CURRENT USE OF INSULIN: ICD-10-CM

## 2019-12-17 PROCEDURE — 99999 PR PBB SHADOW E&M-EST. PATIENT-LVL I: ICD-10-PCS | Mod: PBBFAC,,,

## 2019-12-17 PROCEDURE — 99211 OFF/OP EST MAY X REQ PHY/QHP: CPT | Mod: PBBFAC,PO | Performed by: REGISTERED NURSE

## 2019-12-17 PROCEDURE — G0108 DIAB MANAGE TRN  PER INDIV: HCPCS | Mod: PBBFAC,PO | Performed by: REGISTERED NURSE

## 2019-12-17 PROCEDURE — 99999 PR PBB SHADOW E&M-EST. PATIENT-LVL I: CPT | Mod: PBBFAC,,,

## 2019-12-17 NOTE — LETTER
December 18, 2019      Andres Venegas MD  [address] [facility] [address]         Patient: Jonas Callahan   MR Number: [id number]   YOB: 1985   Date of Visit: 12/17/2019       Dear Dr. Miranda:  Thank you for referring Jonas for diabetes self-management education and support. He has completed all components of our Diabetes Management Program and his Self-Management Support Plan. Below is a summary of his clinical outcomes and goal progress.    Patient Outcomes:    A1c Status: No results found for: HGBA1C  Goals  Patient has selected/evaluated goals during today's session: Yes, evaluated  Healthy Eating: % Met  Met Percentage : 75%  Physical Activity: % Met  Met Percentage : 75%  Monitoring: % Met  Met Percentage : 100%    Diabetes Self-Management Support Plan  Exercise/Nutrition: websites  Review Status: Patient has selected and agrees to support plan., Patient was provided Diabetes Self-Management Support Plan document that includes support options.    Follow up:   · Jonas to follow diabetes support plan indicated above  · Jonas to attend medical appointments as scheduled  · Jonas to update you on his DM education progress as needed      If you have questions, please do not hesitate to call me. I look forward to providing additional education and support as needed.    Sincerely,    Lindsey Burton RN

## 2019-12-18 VITALS — WEIGHT: 211 LBS | BODY MASS INDEX: 30.28 KG/M2

## 2019-12-18 NOTE — PROGRESS NOTES
Diabetes Education  Author: Lindsey Burton RN  Date: 12/18/2019  Diabetes Care Management Summary  Diabetes Education Record Assessment/Progress: Post Program/Follow-up  Current Diabetes Risk Level: Moderate   Last A1c: No results found for: HGBA1C  Last visit with Diabetes Educator: : 12/17/2019  Diabetes Type  Diabetes Type : Type II  Diabetes History  Current Treatment: Oral Medication, Diet, Exercise  Health Maintenance was reviewed today with patient. Discussed with patient importance of routine eye exams, foot exams/foot care, blood work (i.e.: A1c, microalbumin, and lipid), dental visits, yearly flu vaccine, and pneumonia vaccine as indicated by PCP. Patient verbalized understanding.     The patient has no Health Maintenance topics of status Not Due  Health Maintenance Due   Topic Date Due    Lipid Panel  1985    Hemoglobin A1c  1985    Foot Exam  11/18/1995    Eye Exam  11/18/1995    Urine Microalbumin  11/18/1995    TETANUS VACCINE  11/18/2003    Pneumococcal Vaccine (Medium Risk) (1 of 1 - PPSV23) 11/18/2004   Nutrition  Meal Planning: drinks regular soda, water, 3 meals per day, snacks between meal  What type of beverages do you drink?: regular soda/tea, juice, water  Meal Plan 24 Hour Recall - Breakfast: mc chelsey sausamilvia burrito, oj, cheerios, milk brownie  Meal Plan 24 Hour Recall - Lunch: pork roast, sweet potato casserole, peas, salad 2 dove squares  Meal Plan 24 Hour Recall - Dinner: pork fried rice, cranraspberry drink, croutons  Meal Plan 24 Hour Recall - Snack: combos chips  Monitoring   Self Monitoring : pt has a meter and is testing once a day fasting in the am. bs's are in normal range. pt keeps a record of his bs's  Blood Glucose Logs: Yes  Do you use a personal continuous glucose monitor?: No  In the last month, how often have you had a low blood sugar reaction?: never  Can you tell when your blood sugar is too high?: no  Exercise   Exercise Type: walking  Intensity:  Low  Frequency: 3-5 Times per week  Duration: 30 min  Current Diabetes Treatment   Current Treatment: Oral Medication, Diet, Exercise  Social History  Preferred Learning Method: Face to Face, Demonstration, Reading Materials  Primary Support: Family  DDS-2 Score  ( > 3 = SIGNIFICANT DISTRESS): 1     Barriers to Change  Barriers to Change: None  Learning Challenges : None  Readiness to Learn   Readiness to Learn : Acceptance  Cultural Influences  Cultural Influences: None  Diabetes Education Assessment/Progress  Diabetes Disease Process (diabetes disease process and treatment options): Not Covered/Deferred  Nutrition (Incorporating nutritional management into one's lifestyle): Discussion, Instructed, Demonstrates Understanding/Competency (verbalizes/demonstrates), Individual Session, Written Materials Provided, Needs Review  Physical Activity (incorporating physical activity into one's lifestyle): Discussion, Instructed, Demonstrates Understanding/Competency (verbalizes/demonstrates), Individual Session  Medications (states correct name, dose, onset, peak, duration, side effects & timing of meds): Discussion, Instructed, Demonstrates Understanding/Competency(verbalizes/demonstrates), Individual Session  Monitoring (monitoring blood glucose/other parameters & using results): Instructed, Demonstrates Understanding/Competency (verbalizes/demonstrates), Individual Session  Acute Complications (preventing, detecting, and treating acute complications): Not Covered/Deferred  Chronic Complications (preventing, detecting, and treating chronic complications): Not Covered/Deferred  Clinical (diabetes, other pertinent medical history, and relevant comorbidities reviewed during visit): Discussion, Instructed, Demonstrates Understanding/Competency (verbalizes/demonstrates), Individual Session  Cognitive (knowledge of self-management skills, functional health literacy): Discussion, Instructed, Demonstrates Understanding/Competency  (verbalizes/demonstrates), Individual Session  Psychosocial (emotional response to diabetes): Discussion, Instructed, Demonstrates Understanding/Competency (verbalizes/demonstrates), Individual Session  Diabetes Distress and Support Systems: Discussion, Instructed, Demonstrates Understanding/Competency (verbalizes/demonstrates), Individual Session  Behavioral (readiness for change, lifestyle practices, self-care behaviors): Discussion, Instructed, Demonstrates Understanding/Competency (verbalizes/demonstrates), Individual Session  Goals  Patient has selected/evaluated goals during today's session: Yes, evaluated  Healthy Eating: % Met  Met Percentage : 75%  Physical Activity: % Met  Met Percentage : 75%  Monitoring: % Met  Met Percentage : 100%  Diabetes Self-Management Support Plan  Exercise/Nutrition: websites  Review Status: Patient has selected and agrees to support plan., Patient was provided Diabetes Self-Management Support Plan document that includes support options.  Diabetes Meal Plan  Restrictions: Restricted Carbohydrate  Calories: 1800  Carbohydrate Per Meal: 45-60g  Carbohydrate Per Snack : 15-20g  Pt came with father for fu visit. Pt is in better spirits and more talkative. Pt states that he lost a few lbs and is feeling better. 24 hour meal recall showed that pt is eating 3 times a day. Pt is eating too much protein and too many carbs at times. Father and mother prepare his meals. Father had misunderstanding and was counting sugars and not carbs resulting in too many carbs at some meals. Discussed carb counting with dad. Discussed how to adjust meals to make them more balanced. Discussed foods that he likes that he could include in his meal plan. Pt is now exercising. Pt was encouraged to keep up his good work. Pt was advised to call for any problems or questions. Pt will fu as needed.  Today's Self-Management Care Plan was developed with the patient's input and is based on barriers identified during  today's assessment.    The long and short-term goals in the care plan were written with the patient/caregiver's input. The patient has agreed to work toward these goals to improve his overall diabetes control.      The patient received a copy of today's self-management plan and verbalized understanding of the care plan, goals, and all of today's instructions.      The patient was encouraged to communicate with his physician and care team regarding his condition(s) and treatment.  I provided the patient with my contact information today and encouraged him to contact me via phone or patient portal as needed.     Education Units of Time   Time Spent: 60 min

## 2020-01-09 ENCOUNTER — TELEPHONE (OUTPATIENT)
Dept: FAMILY MEDICINE | Facility: HOSPITAL | Age: 35
End: 2020-01-09

## 2020-01-09 NOTE — TELEPHONE ENCOUNTER
Contacted patient's father and notified him that we need an involement of care to discuss patient's health.   Father stated they will complete form at next appointment.   Father stated he has a question if any of the medications patient is taking could cause hair loss but does want to discuss with patient.

## 2020-01-09 NOTE — TELEPHONE ENCOUNTER
----- Message from Rachael Reveles, Patient Care Assistant sent at 1/8/2020 10:07 AM CST -----  Contact: contact pt father   [phone]  Father has a question about pts diabetes medicine.

## 2020-03-06 ENCOUNTER — OFFICE VISIT (OUTPATIENT)
Dept: FAMILY MEDICINE | Facility: HOSPITAL | Age: 35
End: 2020-03-06
Attending: FAMILY MEDICINE
Payer: MEDICAID

## 2020-03-06 VITALS
SYSTOLIC BLOOD PRESSURE: 122 MMHG | HEART RATE: 97 BPM | WEIGHT: 211 LBS | DIASTOLIC BLOOD PRESSURE: 88 MMHG | HEIGHT: 70 IN | BODY MASS INDEX: 30.21 KG/M2

## 2020-03-06 DIAGNOSIS — E11.9 CONTROLLED TYPE 2 DIABETES MELLITUS WITHOUT COMPLICATION, WITHOUT LONG-TERM CURRENT USE OF INSULIN: Primary | ICD-10-CM

## 2020-03-06 DIAGNOSIS — E66.9 OBESITY (BMI 30.0-34.9): ICD-10-CM

## 2020-03-06 PROCEDURE — 99213 OFFICE O/P EST LOW 20 MIN: CPT | Performed by: STUDENT IN AN ORGANIZED HEALTH CARE EDUCATION/TRAINING PROGRAM

## 2020-03-10 NOTE — PROGRESS NOTES
"Subjective:       Patient ID: Robert Callahan is a 34 y.o. male.    Chief Complaint: Follow-up DMI    HPI   Pt is a 35 yo M w/ pmhx of DMII, bipolar disorder, obesity presenting for f/u of DMII. Pt father states he has been compliant with his home medications of glipizide and metformin. Father has tracked his BG and readings range from 90s-110s. Pt has gained weight since last visit. He is trying to change to a healthier diet, but sometimes eats "unhealthy." He has increased his exercise regimen to elliptical and walking daily. Pt sleeps 14-16 hrs /day. Pt denies symptoms of hyper/hypoglycemia, dizziness, chills, sweats, polyuria, polydipsia.     Review of Systems   Constitutional: Negative for chills, diaphoresis, fatigue and fever.   HENT: Negative for congestion, rhinorrhea, sore throat and trouble swallowing.    Eyes: Negative for itching and visual disturbance.   Respiratory: Negative for cough, chest tightness, shortness of breath and wheezing.    Cardiovascular: Negative for chest pain and palpitations.   Gastrointestinal: Negative for abdominal pain, constipation, diarrhea, nausea and vomiting.   Endocrine: Negative for polyphagia and polyuria.   Genitourinary: Negative for dysuria and flank pain.   Musculoskeletal: Negative for back pain, joint swelling and neck stiffness.   Skin: Negative for color change and rash.   Neurological: Negative for dizziness, weakness, light-headedness and headaches.   Psychiatric/Behavioral: Negative for confusion. The patient is not nervous/anxious.        Objective:      Vitals:    03/06/20 1121   BP: 122/88   Pulse: 97     Physical Exam   Constitutional: He is oriented to person, place, and time. He appears well-developed and well-nourished. No distress.   obese   HENT:   Head: Normocephalic and atraumatic.   Mouth/Throat: Oropharynx is clear and moist.   Eyes: Pupils are equal, round, and reactive to light. Conjunctivae and EOM are normal.   Neck: Normal range of " motion. Neck supple.   Cardiovascular: Normal rate, regular rhythm, normal heart sounds and intact distal pulses.   Pulmonary/Chest: Effort normal and breath sounds normal. No respiratory distress. He has no wheezes.   Abdominal: Soft. Bowel sounds are normal. There is no tenderness.   Musculoskeletal: Normal range of motion. He exhibits no edema.   Neurological: He is alert and oriented to person, place, and time.   Skin: Skin is warm and dry. Capillary refill takes less than 2 seconds. He is not diaphoretic. No erythema.   Psychiatric: He has a normal mood and affect. His behavior is normal.   Vitals reviewed.      Assessment:       1. Controlled type 2 diabetes mellitus without complication, without long-term current use of insulin    2. Obesity (BMI 30.0-34.9)        Plan:       Controlled type 2 diabetes mellitus without complication, without long-term current use of insulin  -Will order HgA1c and lipid panel today through [facility]  -Counseled pt to discontinue his glipizide with likely anticipation that his HgA1c will be close to a normal range    Obesity (BMI 30.0-34.9)  -Counseled pt on diet and exercise    Follow up in about 6 months (around 9/6/2020), or if symptoms worsen or fail to improve, for DMII.      Andres Venegas,  HO-1  [facility]

## 2020-11-02 ENCOUNTER — TELEPHONE (OUTPATIENT)
Dept: FAMILY MEDICINE | Facility: HOSPITAL | Age: 35
End: 2020-11-02

## 2020-11-02 NOTE — TELEPHONE ENCOUNTER
----- Message from Katarina Smith MA sent at 11/2/2020  9:58 AM CST -----  Contact: Patient [phone]  Please send refill on metFORMIN (GLUCOPHAGE) 500 MG tablet to [facility].  Thanks.

## 2020-11-03 DIAGNOSIS — E11.65 TYPE 2 DIABETES MELLITUS WITH HYPERGLYCEMIA, WITHOUT LONG-TERM CURRENT USE OF INSULIN: ICD-10-CM

## 2020-11-03 NOTE — TELEPHONE ENCOUNTER
----- Message from Joana Blue sent at 11/3/2020  1:59 PM CST -----  Pt needs a refill for metFORMIN (GLUCOPHAGE) 500 MG tablet. Please send to [facility] [place] [place] [address] [facility].

## 2020-11-09 DIAGNOSIS — E11.65 TYPE 2 DIABETES MELLITUS WITH HYPERGLYCEMIA, WITHOUT LONG-TERM CURRENT USE OF INSULIN: ICD-10-CM

## 2020-11-12 ENCOUNTER — OFFICE VISIT (OUTPATIENT)
Dept: FAMILY MEDICINE | Facility: HOSPITAL | Age: 35
End: 2020-11-12
Payer: MEDICAID

## 2020-11-12 VITALS
SYSTOLIC BLOOD PRESSURE: 144 MMHG | HEART RATE: 104 BPM | WEIGHT: 221.56 LBS | HEIGHT: 70 IN | DIASTOLIC BLOOD PRESSURE: 99 MMHG | BODY MASS INDEX: 31.72 KG/M2

## 2020-11-12 DIAGNOSIS — E66.9 OBESITY (BMI 30.0-34.9): ICD-10-CM

## 2020-11-12 DIAGNOSIS — Z28.21 REFUSED INFLUENZA VACCINE: ICD-10-CM

## 2020-11-12 DIAGNOSIS — M24.542 CONTRACTURE OF JOINT OF FINGER OF LEFT HAND: ICD-10-CM

## 2020-11-12 DIAGNOSIS — E11.65 TYPE 2 DIABETES MELLITUS WITH HYPERGLYCEMIA, WITHOUT LONG-TERM CURRENT USE OF INSULIN: Primary | ICD-10-CM

## 2020-11-12 PROCEDURE — 99214 OFFICE O/P EST MOD 30 MIN: CPT | Performed by: STUDENT IN AN ORGANIZED HEALTH CARE EDUCATION/TRAINING PROGRAM

## 2020-11-12 RX ORDER — METFORMIN HYDROCHLORIDE 500 MG/1
500 TABLET ORAL 2 TIMES DAILY WITH MEALS
Qty: 180 TABLET | Refills: 3 | Status: SHIPPED | OUTPATIENT
Start: 2020-11-12 | End: 2020-12-09

## 2020-11-12 NOTE — PROGRESS NOTES
Subjective:       Patient ID: Robert Callahan is a 34 y.o. male.    Chief Complaint: Diabetes    Robert Callahan is a 34 y.o. male who presents urgently for diabetes medication refill. The patient states he is on his last metformin pill. He notes compliance with diabetic regimen. His father reports that they have cut down his portion size and that he is also walking 3 miles a day and on the elliptical for 30 minutes a day. Fasting BG range from . Higher BG readings occur when he doesn't exercise the day prior.     Review of Systems   Constitutional: Negative for activity change, chills, fatigue and fever.   HENT: Negative for congestion and ear pain.    Eyes: Negative for pain.   Respiratory: Negative for apnea, chest tightness, shortness of breath and wheezing.    Cardiovascular: Negative for chest pain and palpitations.   Gastrointestinal: Negative for abdominal distention, abdominal pain, constipation, diarrhea, nausea and rectal pain.   Endocrine: Negative for polydipsia.   Genitourinary: Negative for flank pain.   Musculoskeletal: Negative for arthralgias, back pain, neck pain and neck stiffness.   Skin: Negative for rash.   Neurological: Negative for dizziness, tremors, facial asymmetry, speech difficulty and light-headedness.   Psychiatric/Behavioral: Negative for agitation.       Objective:      Vitals:    11/12/20 1523   BP: (!) 144/99   Pulse: 104     Physical Exam  Vitals signs and nursing note reviewed.   Constitutional:       Appearance: He is well-developed. He is obese.      Comments: Body mass index is 31.79 kg/m².     HENT:      Head: Normocephalic and atraumatic.      Right Ear: External ear normal.      Left Ear: External ear normal.      Nose: Nose normal.      Mouth/Throat:      Pharynx: Oropharynx is clear.   Eyes:      Conjunctiva/sclera: Conjunctivae normal.      Pupils: Pupils are equal, round, and reactive to light.   Neck:      Musculoskeletal: Normal range of motion  and neck supple.   Cardiovascular:      Rate and Rhythm: Normal rate and regular rhythm.      Pulses: Normal pulses.      Heart sounds: Normal heart sounds. No murmur. No friction rub. No gallop.    Pulmonary:      Effort: Pulmonary effort is normal.      Breath sounds: Normal breath sounds.   Abdominal:      General: Bowel sounds are normal. There is no distension.      Palpations: Abdomen is soft.      Tenderness: There is no abdominal tenderness. There is no guarding.   Musculoskeletal: Normal range of motion.        Arms:    Skin:     General: Skin is warm and dry.      Capillary Refill: Capillary refill takes less than 2 seconds.   Neurological:      Mental Status: He is alert. Mental status is at baseline.   Psychiatric:         Behavior: Behavior is cooperative.         Assessment:       1. Type 2 diabetes mellitus with hyperglycemia, without long-term current use of insulin    2. Contracture of joint of finger of left hand    3. Obesity (BMI 30.0-34.9)    4. Refused influenza vaccine        Plan:       Type 2 diabetes mellitus with hyperglycemia, without long-term current use of insulin  -     metFORMIN (GLUCOPHAGE) 500 MG tablet; Take 1 tablet (500 mg total) by mouth 2 (two) times daily with meals.  Dispense: 180 tablet; Refill: 3  -     Hemoglobin A1C; Future; Expected date: 11/12/2020  -     Microalbumin/creatinine urine ratio; Future; Expected date: 11/12/2020  -     Ambulatory referral/consult to Ophthalmology; Future; Expected date: 11/19/2020  -     Lipid Panel; Future; Expected date: 11/12/2020  -     Comprehensive Metabolic Panel; Future; Expected date: 11/12/2020    Contracture of joint of finger of left hand  -     Ambulatory referral/consult to Physical/Occupational Therapy; Future; Expected date: 11/19/2020    Obesity (BMI 30.0-34.9)         -    Continue diet and exercise     Refused influenza vaccine         -      Aware of the risk     Follow up for with PCP Dr. Venegas, monitor BP at next visit  and start medication if elevated .     D'Amico C Johnson, MD  11/13/2020  2:23 PM  [facility]   House Officer -III

## 2020-11-13 RX ORDER — METFORMIN HYDROCHLORIDE 500 MG/1
500 TABLET ORAL 2 TIMES DAILY WITH MEALS
Qty: 180 TABLET | Refills: 3 | OUTPATIENT
Start: 2020-11-13 | End: 2021-11-13

## 2020-11-17 ENCOUNTER — TELEPHONE (OUTPATIENT)
Dept: FAMILY MEDICINE | Facility: HOSPITAL | Age: 35
End: 2020-11-17

## 2020-11-18 RX ORDER — METFORMIN HYDROCHLORIDE 500 MG/1
500 TABLET ORAL 2 TIMES DAILY WITH MEALS
Qty: 180 TABLET | Refills: 3 | OUTPATIENT
Start: 2020-11-18 | End: 2021-11-18

## 2020-12-01 ENCOUNTER — CLINICAL SUPPORT (OUTPATIENT)
Dept: REHABILITATION | Facility: HOSPITAL | Age: 35
End: 2020-12-01
Payer: MEDICAID

## 2020-12-01 DIAGNOSIS — M24.542 CONTRACTURE OF JOINT OF LEFT HAND: ICD-10-CM

## 2020-12-01 DIAGNOSIS — M24.542 CONTRACTURE OF JOINT OF FINGER OF LEFT HAND: ICD-10-CM

## 2020-12-01 PROCEDURE — 97165 OT EVAL LOW COMPLEX 30 MIN: CPT

## 2020-12-01 NOTE — PLAN OF CARE
[facility] Occupational Therapy  Initial Evaluation     Name: Robert Callahan  Clinic Number: [id number]    Therapy Diagnosis:   Encounter Diagnoses   Name Primary?    Contracture of joint of finger of left hand     Contracture of joint of left hand      Physician: Johnson, D'Amico C., MD    Physician Orders: eval only , patient and his dad are here wanting to be fitted for long term silver ring splints     Medical Diagnosis: M24.542 (ICD-10-CM) - Contracture of joint of finger of left hand     Surgical Procedure/ Date : none   Evaluation Date: 12/1/2020  Insurance: medicaid   Insurance Authorization period Expiration: 12/15/20  Plan of Care Certification Period: 12/30/20     Visit # / Visits Authortized: 1 / 1  Time In:3:15 pm   Time Out: 4:00 pm   Total Billable Time: 45  minutes    Precautions: Standard    Subjective     Involved Side:  left  Dominant Side: Right  Date of Onset:  Years ago   Mechanism of Injury:  Years of contractures to left middle , ring and little  Finger   History of Current Condition: was seen last year for contractures , pt has been using oval 8 splints he is accompanied with his dad and they are wanting to be fitted for long term silver ring splints ,     Imaging: none   Previous Therapy:  Yes last year 2019     Patient's Goals for Therapy: wanting permanent splinting for support to     Pain:  Functional Pain Scale Rating 0-10:   0/10 on average  0/10 at best  0/10 at worst  Locationleft: middle , ring and  little fingers   Description:  None   Aggravating Factors: Bending  Easing Factors: massage    Occupation:   Unemployed   Working presently: unemployed  Duties: none     Functional Limitations/Social History:    Previous functional status includes: Independent with all ADLs.     Current FunctionalStatus   Home/Living environment : lives with their family      Limitation of Functional Status as follows:   ADLs/IADLs:     - Feeding:Not Necessary    - Bathing:Not  Necessary    - Dressing/Grooming: Minimal-Moderate Modifications/Assistance    - Driving: Not Necessary       Leisure: plays the [employer]       Past Medical History/Physical Systems Review:   Robert Callahan  has no past medical history on file.    Robert Callahan  has no past surgical history on file.    Robert has a current medication list which includes the following prescription(s): amoxicillin, clozapine, invega sustenna, metformin, onetouch delica plus lancet, paliperidone, potassium chloride, prevident 5000 sensitive, and venlafaxine.    Review of patient's allergies indicates:   Allergen Reactions    Penicillin           Objective     Observation/Appearance:  Skin intact and Skin dry       Hand ROM. Measured in degrees.   12/1/2020       Left                    Long:  MP 0/90                 PIP 20/80                 DIP   +10/ 0                 BAIN              Ring:   MP 0/90                 PIP 35/90                 DIP +10/0                 BAIN              Small:  MP 0/45                  PIP +10/15                  DIP +40/0                 BAIN               Sensation  Median Nerve Distribution 12/1/2020 12/1/2020    Left Right   Streeter Gomez     Normal 1.65-2.83 X     Diminished Light Touch 3.22-3.61     Diminished Protective 3.84-4.31     Loss of Protective 4.56-6.65     Untestable >6.65     2 Point Discrimination     Static     Dynamic           CMS Impairment/Limitation/Restriction for FOTO initial  Survey    Therapist reviewed FOTO scores for Robert Callahan on 12/1/2020.   FOTO documents entered into "[employer] - see Media section.                    12/1/2020              Treatment     Treatment Time In: 3:15 pm   Treatment Time Out: 4:00 pm     Total Treatment time separate from Evaluation time:45  Fitted for silver ring splint   Middle finger left middle  prox 11.0, distal 6.5 swan neck DIP jt   left ring finger prox 10.5 , distal 6.0 swan neck splint for DIPjt    Left small  finger prox 6.0 , distal 4.0  Dip jt    Silver ring splint form filled out and patient will order splint and will arrive that their home  ,     form scanned into media section for future use.       Home Exercise Program/Education:  Issued HEP: none      Patient/Family Education: role of OT, goals for OT, scheduling/cancellations - pt verbalized understanding. Discussed insurance limitations with patient.    Additional Education provided:  None     Assessment     Robert Callahan is a 35 y.o. male referred to outpatient occupational/hand therapy and presents with a medical diagnosis of  Left contracture middle , ring and small finger , resulting in difficulty in playing the guitar  and demonstrates limitations as described in the chart below. Following  medical record review it is determined that pt will benefit from occupational therapy services in order to maximize pain free and/or functional use of left hand .     Anticipated barriers to occupational therapy: none   Pt has no cultural, educational or language barriers to learning provided.    Profile and History Assessment of Occupational Performance Level of Clinical Decision Making Complexity Score   Occupational Profile:   Robert Callahan is a 35 y.o. male who lives with their family and is currently unemployed . Robert Callahan has difficulty with  Dressing, fine motor to manage clothing   play guitar   affecting his/her daily functional abilities. His/her main goal for therapy is  Use of splinting .     Comorbidities:    has no past medical history on file.    Medical and Therapy History Review:   Brief               Performance Deficits    Physical:   swan neck deformity     Cognitive:  No Deficits    Psychosocial:    No Deficits     Clinical Decision Making:  low    Assessment Process:  Problem-Focused Assessments    Modification/Need for Assistance:  Not Necessary    Intervention Selection:  Limited Treatment Options       low  Based on  PMHX, co morbidities , data from assessments and functional level of assistance required with task and clinical presentation directly impacting function.       The following goals were discussed with the patient and patient is in agreement with them as to be addressed in the treatment plan.          GOALS: no goals required .  }      Plan   Certification Period/Plan of care expiration: 12/1/2020 to 12/30/20 .    Pt does not require any additional therapy .   Jes Brown, OT

## 2020-12-04 ENCOUNTER — LAB VISIT (OUTPATIENT)
Dept: LAB | Facility: HOSPITAL | Age: 35
End: 2020-12-04
Payer: MEDICAID

## 2020-12-04 DIAGNOSIS — E11.65 TYPE 2 DIABETES MELLITUS WITH HYPERGLYCEMIA, WITHOUT LONG-TERM CURRENT USE OF INSULIN: ICD-10-CM

## 2020-12-04 LAB
ALBUMIN SERPL BCP-MCNC: 4.5 G/DL (ref 3.5–5.2)
ALP SERPL-CCNC: 66 U/L (ref 55–135)
ALT SERPL W/O P-5'-P-CCNC: 129 U/L (ref 10–44)
ANION GAP SERPL CALC-SCNC: 11 MMOL/L (ref 8–16)
AST SERPL-CCNC: 51 U/L (ref 10–40)
BILIRUB SERPL-MCNC: 0.7 MG/DL (ref 0.1–1)
BUN SERPL-MCNC: 11 MG/DL (ref 6–20)
CALCIUM SERPL-MCNC: 9.7 MG/DL (ref 8.7–10.5)
CHLORIDE SERPL-SCNC: 98 MMOL/L (ref 95–110)
CHOLEST SERPL-MCNC: 234 MG/DL (ref 120–199)
CHOLEST/HDLC SERPL: 7.3 {RATIO} (ref 2–5)
CO2 SERPL-SCNC: 29 MMOL/L (ref 23–29)
CREAT SERPL-MCNC: 1.1 MG/DL (ref 0.5–1.4)
EST. GFR  (AFRICAN AMERICAN): >60 ML/MIN/1.73 M^2
EST. GFR  (NON AFRICAN AMERICAN): >60 ML/MIN/1.73 M^2
GLUCOSE SERPL-MCNC: 273 MG/DL (ref 70–110)
HDLC SERPL-MCNC: 32 MG/DL (ref 40–75)
HDLC SERPL: 13.7 % (ref 20–50)
LDLC SERPL CALC-MCNC: 131 MG/DL (ref 63–159)
NONHDLC SERPL-MCNC: 202 MG/DL
POTASSIUM SERPL-SCNC: 3.9 MMOL/L (ref 3.5–5.1)
PROT SERPL-MCNC: 8.5 G/DL (ref 6–8.4)
SODIUM SERPL-SCNC: 138 MMOL/L (ref 136–145)
TRIGL SERPL-MCNC: 355 MG/DL (ref 30–150)

## 2020-12-04 PROCEDURE — 80061 LIPID PANEL: CPT

## 2020-12-04 PROCEDURE — 36415 COLL VENOUS BLD VENIPUNCTURE: CPT

## 2020-12-04 PROCEDURE — 83036 HEMOGLOBIN GLYCOSYLATED A1C: CPT

## 2020-12-04 PROCEDURE — 80053 COMPREHEN METABOLIC PANEL: CPT

## 2020-12-05 LAB
ESTIMATED AVG GLUCOSE: 171 MG/DL (ref 68–131)
HBA1C MFR BLD HPLC: 7.6 % (ref 4–5.6)

## 2020-12-09 ENCOUNTER — OFFICE VISIT (OUTPATIENT)
Dept: FAMILY MEDICINE | Facility: HOSPITAL | Age: 35
End: 2020-12-09
Attending: FAMILY MEDICINE
Payer: MEDICAID

## 2020-12-09 VITALS
BODY MASS INDEX: 31.59 KG/M2 | HEIGHT: 70 IN | SYSTOLIC BLOOD PRESSURE: 126 MMHG | WEIGHT: 220.69 LBS | HEART RATE: 108 BPM | DIASTOLIC BLOOD PRESSURE: 90 MMHG

## 2020-12-09 DIAGNOSIS — E11.65 TYPE 2 DIABETES MELLITUS WITH HYPERGLYCEMIA, WITHOUT LONG-TERM CURRENT USE OF INSULIN: ICD-10-CM

## 2020-12-09 PROCEDURE — 99213 OFFICE O/P EST LOW 20 MIN: CPT | Performed by: STUDENT IN AN ORGANIZED HEALTH CARE EDUCATION/TRAINING PROGRAM

## 2020-12-09 RX ORDER — ATORVASTATIN CALCIUM 20 MG/1
20 TABLET, FILM COATED ORAL DAILY
Qty: 90 TABLET | Refills: 3 | Status: SHIPPED | OUTPATIENT
Start: 2020-12-09 | End: 2021-10-26 | Stop reason: SDUPTHER

## 2020-12-09 RX ORDER — METFORMIN HYDROCHLORIDE 500 MG/1
1000 TABLET ORAL 2 TIMES DAILY WITH MEALS
Qty: 360 TABLET | Refills: 3 | Status: SHIPPED | OUTPATIENT
Start: 2020-12-09 | End: 2021-10-26 | Stop reason: SDUPTHER

## 2020-12-09 RX ORDER — LISINOPRIL 10 MG/1
10 TABLET ORAL DAILY
Qty: 90 TABLET | Refills: 3 | Status: SHIPPED | OUTPATIENT
Start: 2020-12-09 | End: 2021-10-26 | Stop reason: SDUPTHER

## 2020-12-09 RX ORDER — LISINOPRIL 10 MG/1
10 TABLET ORAL DAILY
Qty: 90 TABLET | Refills: 3 | Status: SHIPPED | OUTPATIENT
Start: 2020-12-09 | End: 2020-12-09

## 2020-12-11 NOTE — PROGRESS NOTES
[facility]      Patient ID:  NAME: Robert Callahan  MR#: [id number]  : 1985    SUBJECTIVE:  CC: Follow-up (blood work)    HPI: Mr. Robert Callahan is a 35 y.o. male with a DMII, bipolar disorder, obesity presenting for f/u of DMII.. Today patient denied any new complains or acute issues. Pt here to f/u on labs drawn at last visit. Pt has had some weight gain and poor diet over past couple of months. Pt also not exercising as much recently.    1) T2DM - Last A1c 7.6 in 20. Reported home BG is around 180s. Currently on: Metformin 500 BID, Lisinopril 10, Atorvastatin 20    Hemoglobin A1C   Date Value Ref Range Status   2020 7.6 (H) 4.0 - 5.6 % Final     Comment:     ADA Screening Guidelines:  5.7-6.4%  Consistent with prediabetes  >or=6.5%  Consistent with diabetes  High levels of fetal hemoglobin interfere with the HbA1C  assay. Heterozygous hemoglobin variants (HbS, HgC, etc)do  not significantly interfere with this assay.   However, presence of multiple variants may affect accuracy.         2) HLD - Last  in 20. Currently on: Atorvastatin 20    LDL Cholesterol   Date Value Ref Range Status   2020 131.0 63.0 - 159.0 mg/dL Final     Comment:     The National Cholesterol Education Program (NCEP) has set the  following guidelines (reference values) for LDL Cholesterol:  Optimal.......................<130 mg/dL  Borderline High...............130-159 mg/dL  High..........................160-189 mg/dL  Very High.....................>190 mg/dL       3) Bipolar disorder  -  Stable, has had for many years. Managed by psychiatry. Currently on: invega, venlafaxine, clozapine. Possibly contributing to DMII.    No past medical history on file.   No past surgical history on file.   No family history on file.   Social History     Socioeconomic History    Marital status: Single     Spouse name: Not on file    Number of children: Not on file    Years of education: Not on file     Highest education level: Not on file   Occupational History    Not on file   Social Needs    Financial resource strain: Not on file    Food insecurity     Worry: Not on file     Inability: Not on file    Transportation needs     Medical: Not on file     Non-medical: Not on file   Tobacco Use    Smoking status: Never Smoker   Substance and Sexual Activity    Alcohol use: No     Frequency: Never    Drug use: Not on file    Sexual activity: Not on file   Lifestyle    Physical activity     Days per week: Not on file     Minutes per session: Not on file    Stress: Not on file   Relationships    Social connections     Talks on phone: Not on file     Gets together: Not on file     Attends Nondenominational service: Not on file     Active member of club or organization: Not on file     Attends meetings of clubs or organizations: Not on file     Relationship status: Not on file   Other Topics Concern    Not on file   Social History Narrative    Not on file      Immunization History   Administered Date(s) Administered    DT (Pediatric) 01/20/2008    Meningococcal Conjugate (MCV4P) 07/06/2006      Review of patient's allergies indicates:   Allergen Reactions    Penicillin       Current Outpatient Medications on File Prior to Visit   Medication Sig    amoxicillin (AMOXIL) 500 MG Tab DNT    cloZAPine (CLOZARIL) 100 MG Tab Take 100 mg by mouth every evening.     INVEGA SUSTENNA 234 mg/1.5 mL Syrg injection 156 mg. Inject 234mg every 28days    ONETOUCH DELICA PLUS LANCET 33 gauge Misc USE UTD BEFORE BREAKFAST    potassium chloride (MICRO-K) 10 MEQ CpSR Take 10 mEq by mouth once daily.    PREVIDENT 5000 SENSITIVE 1.1-5 % Pste U UTD    venlafaxine (EFFEXOR-XR) 150 MG Cp24 Take 150 mg by mouth once daily.    paliperidone (INVEGA) 9 MG TR24 Take 9 mg by mouth once daily.     No current facility-administered medications on file prior to visit.       Review of Systems   Constitutional: Negative for chills,  "diaphoresis, fever, malaise/fatigue and weight loss.   HENT: Negative for congestion and sore throat.    Eyes: Negative for blurred vision and double vision.   Respiratory: Negative for cough, shortness of breath and wheezing.    Cardiovascular: Negative for chest pain, palpitations and leg swelling.   Gastrointestinal: Negative for abdominal pain, constipation, diarrhea, melena, nausea and vomiting.   Genitourinary: Negative for dysuria and hematuria.   Musculoskeletal: Negative for back pain, joint pain and myalgias.   Skin: Negative for itching and rash.   Neurological: Negative for dizziness, weakness and headaches.   Endo/Heme/Allergies: Negative for environmental allergies. Does not bruise/bleed easily.   Psychiatric/Behavioral: Negative for depression. The patient is not nervous/anxious.       OBJECTIVE:   BP (!) 126/90   Pulse 108   Ht 5' 10" (1.778 m)   Wt 100.1 kg (220 lb 10.9 oz)   BMI 31.66 kg/m²    BP Readings from Last 4 Encounters:   12/09/20 (!) 126/90   11/12/20 (!) 144/99   03/06/20 122/88   11/19/19 (!) 129/90      Wt Readings from Last 4 Encounters:   12/09/20 100.1 kg (220 lb 10.9 oz)   11/12/20 100.5 kg (221 lb 9 oz)   03/06/20 95.7 kg (210 lb 15.7 oz)   12/17/19 95.7 kg (211 lb)      Physical Exam:  Physical Exam   Constitutional: He is oriented to person, place, and time and well-developed, well-nourished, and in no distress. No distress.   obese   HENT:   Head: Normocephalic and atraumatic.   Mouth/Throat: Oropharynx is clear and moist.   Eyes: Pupils are equal, round, and reactive to light. Conjunctivae and EOM are normal.   Neck: Normal range of motion. Neck supple.   Cardiovascular: Normal rate, regular rhythm, normal heart sounds and intact distal pulses.   Pulmonary/Chest: Effort normal and breath sounds normal.   Abdominal: Soft. Bowel sounds are normal.   Musculoskeletal: Normal range of motion.   Neurological: He is alert and oriented to person, place, and time.   Skin: Skin is " warm and dry. He is not diaphoretic.   Psychiatric: Affect normal.   Vitals reviewed.        Lab Results   Component Value Date    WBC 5.75 04/03/2010    HGB 15.4 04/03/2010    HCT 44.7 04/03/2010    MCV 84.5 04/03/2010     04/03/2010     Lab Results   Component Value Date     12/04/2020    K 3.9 12/04/2020    CL 98 12/04/2020    CO2 29 12/04/2020    BUN 11 12/04/2020    CREATININE 1.1 12/04/2020    CALCIUM 9.7 12/04/2020     Lab Results   Component Value Date    AST 51 (H) 12/04/2020     (H) 12/04/2020    ALBUMIN 4.5 12/04/2020    PROT 8.5 (H) 12/04/2020    BILITOT 0.7 12/04/2020     Lab Results   Component Value Date    HGBA1C 7.6 (H) 12/04/2020    TSH 1.36 04/03/2010       Lab Results   Component Value Date    CHOL 234 (H) 12/04/2020    TRIG 355 (H) 12/04/2020    HDL 32 (L) 12/04/2020    LDLCALC 131.0 12/04/2020     The ASCVD Risk score (Emiliana DC Jr., et al., 2013) failed to calculate for the following reasons:    The 2013 ASCVD risk score is only valid for ages 40 to 79   No results found for: HAV, HEPAIGM, HEPBIGM, HEPBCAB, HBEAG, HEPCAB   No results found for: HIV1X2     ASSESSMENT:  1. Type 2 diabetes mellitus with hyperglycemia, without long-term current use of insulin        PLAN:  1. Type 2 diabetes mellitus with hyperglycemia, without long-term current use of insulin  - metFORMIN (GLUCOPHAGE) 500 MG tablet; Take 2 tablets (1,000 mg total) by mouth 2 (two) times daily with meals.  Dispense: 360 tablet; Refill: 3  - atorvastatin (LIPITOR) 20 MG tablet; Take 1 tablet (20 mg total) by mouth once daily.  Dispense: 90 tablet; Refill: 3  - lisinopriL 10 MG tablet; Take 1 tablet (10 mg total) by mouth once daily.  Dispense: 90 tablet; Refill: 3      Immunization History   Administered Date(s) Administered    DT (Pediatric) 01/20/2008    Meningococcal Conjugate (MCV4P) 07/06/2006        Health Maintenance:  - Colonoscopy: N/A   (Grade A: adults 50-75; colonoscopy q10y or annual fecal  immunochemical testing (FIT) as first-tier test; CT colonography q5y, FIT-fecal DNA testing q3y, or flexible sigmoidoscopy q5-10 years as second-tier tests; and capsule colonography q5y as a third-tier test)  - DM: 7  (Grade B: adults 40-70 with BMI ?25; if normal, repeat q3y)  - If Diabetic:   - Foot Exam: DUE     - Eye Exam: DUE   - Statin: YES  (Grade B: adults 40-75 years with no history of CVD, ?1 CVD risk factors (dyslipidemia, DM, HTN, or smoking), and ASCVD ?10%)  - Flu: Instructed patient to receive flu vaccine - Patient agreed  (All patients ?6 months, qyear)  - PCV 13: N/A  (adults ?65 years; adults <64 years with HIV, asplenia, CKD, malignancy or solid organ transplant)  - PPSV 23: N/A  (adults ?65 years; adults <64 years who smoke, long-term facility care resident, heart disease (ex. HTN), lung disease, liver disease, DM or alcohol)   - If PPSV 23 is given, wait 1 year before giving PCV 13  - Shingles: Instructed patient to receive shingles vaccine - Patient N/A  (adults ?50 years)  - HCV: N/A  (Grade B: screen all patients age 18-79)  - HIV: N/A  (Grade A: ages 15-65 years; ?66 if high-risk)  - LDCT: N/A  (Grade B: q1yr for adults 55-80 years with 30 PY and currently smoke or have quit ?15 years)  - US abdomen: N/A   (Grade B: one-time screening for AAA in men 65-75 years who have ever smoked)  - ASA: N/A  (Grade B: low dose ASA for adults 50-59 years with a ?10% 10-year cardiovascular risk)  - Depression: NO  (All adults)  - Fall risk: NO  Get Up and Go Test (positive >30 seconds, or negative <20; get up, walk 10 feet, turn around and sit; adults ?65 years).  - Tobacco abuse: NEVER SMOKER  (Grade A: all adults)    DUE AT NEXT/FUTURE VISIT:  Diabetes screening        No future appointments.    Dispo: RTC 3 months with labs. Prescribed acei and statin today. Counseled pt to increase metformin to 1000mg BID and increase exercise and diet control. Pt understands.     Andres Venegas MD, PGY-II  12/11/2020

## 2021-04-05 ENCOUNTER — LAB VISIT (OUTPATIENT)
Dept: LAB | Facility: HOSPITAL | Age: 36
End: 2021-04-05
Attending: FAMILY MEDICINE
Payer: MEDICAID

## 2021-04-05 ENCOUNTER — OFFICE VISIT (OUTPATIENT)
Dept: FAMILY MEDICINE | Facility: HOSPITAL | Age: 36
End: 2021-04-05
Attending: FAMILY MEDICINE
Payer: MEDICAID

## 2021-04-05 VITALS
WEIGHT: 222.88 LBS | BODY MASS INDEX: 31.91 KG/M2 | HEART RATE: 97 BPM | SYSTOLIC BLOOD PRESSURE: 126 MMHG | HEIGHT: 70 IN | DIASTOLIC BLOOD PRESSURE: 88 MMHG

## 2021-04-05 DIAGNOSIS — E66.9 OBESITY (BMI 30.0-34.9): ICD-10-CM

## 2021-04-05 DIAGNOSIS — E11.9 CONTROLLED TYPE 2 DIABETES MELLITUS WITHOUT COMPLICATION, WITHOUT LONG-TERM CURRENT USE OF INSULIN: ICD-10-CM

## 2021-04-05 DIAGNOSIS — E11.9 CONTROLLED TYPE 2 DIABETES MELLITUS WITHOUT COMPLICATION, WITHOUT LONG-TERM CURRENT USE OF INSULIN: Primary | ICD-10-CM

## 2021-04-05 LAB
ALBUMIN SERPL BCP-MCNC: 4.4 G/DL (ref 3.5–5.2)
ALP SERPL-CCNC: 59 U/L (ref 55–135)
ALT SERPL W/O P-5'-P-CCNC: 142 U/L (ref 10–44)
ANION GAP SERPL CALC-SCNC: 12 MMOL/L (ref 8–16)
AST SERPL-CCNC: 51 U/L (ref 10–40)
BILIRUB SERPL-MCNC: 0.6 MG/DL (ref 0.1–1)
BUN SERPL-MCNC: 11 MG/DL (ref 6–20)
CALCIUM SERPL-MCNC: 9.8 MG/DL (ref 8.7–10.5)
CHLORIDE SERPL-SCNC: 102 MMOL/L (ref 95–110)
CHOLEST SERPL-MCNC: 123 MG/DL (ref 120–199)
CHOLEST/HDLC SERPL: 4.1 {RATIO} (ref 2–5)
CO2 SERPL-SCNC: 28 MMOL/L (ref 23–29)
CREAT SERPL-MCNC: 0.9 MG/DL (ref 0.5–1.4)
EST. GFR  (AFRICAN AMERICAN): >60 ML/MIN/1.73 M^2
EST. GFR  (NON AFRICAN AMERICAN): >60 ML/MIN/1.73 M^2
ESTIMATED AVG GLUCOSE: 137 MG/DL (ref 68–131)
GLUCOSE SERPL-MCNC: 103 MG/DL (ref 70–110)
HBA1C MFR BLD: 6.4 % (ref 4–5.6)
HDLC SERPL-MCNC: 30 MG/DL (ref 40–75)
HDLC SERPL: 24.4 % (ref 20–50)
LDLC SERPL CALC-MCNC: 49.6 MG/DL (ref 63–159)
NONHDLC SERPL-MCNC: 93 MG/DL
POTASSIUM SERPL-SCNC: 4.3 MMOL/L (ref 3.5–5.1)
PROT SERPL-MCNC: 7.9 G/DL (ref 6–8.4)
SODIUM SERPL-SCNC: 142 MMOL/L (ref 136–145)
TRIGL SERPL-MCNC: 217 MG/DL (ref 30–150)

## 2021-04-05 PROCEDURE — 36415 COLL VENOUS BLD VENIPUNCTURE: CPT | Performed by: STUDENT IN AN ORGANIZED HEALTH CARE EDUCATION/TRAINING PROGRAM

## 2021-04-05 PROCEDURE — 83036 HEMOGLOBIN GLYCOSYLATED A1C: CPT | Performed by: STUDENT IN AN ORGANIZED HEALTH CARE EDUCATION/TRAINING PROGRAM

## 2021-04-05 PROCEDURE — 80053 COMPREHEN METABOLIC PANEL: CPT | Performed by: STUDENT IN AN ORGANIZED HEALTH CARE EDUCATION/TRAINING PROGRAM

## 2021-04-05 PROCEDURE — 99213 OFFICE O/P EST LOW 20 MIN: CPT | Performed by: STUDENT IN AN ORGANIZED HEALTH CARE EDUCATION/TRAINING PROGRAM

## 2021-04-05 PROCEDURE — 82607 VITAMIN B-12: CPT | Performed by: STUDENT IN AN ORGANIZED HEALTH CARE EDUCATION/TRAINING PROGRAM

## 2021-04-05 PROCEDURE — 80061 LIPID PANEL: CPT | Performed by: STUDENT IN AN ORGANIZED HEALTH CARE EDUCATION/TRAINING PROGRAM

## 2021-04-05 RX ORDER — LANCETS 33 GAUGE
EACH MISCELLANEOUS
Qty: 100 EACH | Refills: 2 | Status: SHIPPED | OUTPATIENT
Start: 2021-04-05 | End: 2021-10-26 | Stop reason: SDUPTHER

## 2021-04-05 RX ORDER — LANCETS 33 GAUGE
EACH MISCELLANEOUS
Qty: 100 EACH | Refills: 2 | Status: SHIPPED | OUTPATIENT
Start: 2021-04-05 | End: 2021-04-05

## 2021-04-05 RX ORDER — BLOOD-GLUCOSE METER
1 EACH MISCELLANEOUS DAILY PRN
Qty: 200 EACH | Refills: 1 | Status: SHIPPED | OUTPATIENT
Start: 2021-04-05 | End: 2021-10-26 | Stop reason: SDUPTHER

## 2021-04-05 RX ORDER — BLOOD-GLUCOSE METER
1 EACH MISCELLANEOUS DAILY PRN
Qty: 200 EACH | Refills: 1 | Status: SHIPPED | OUTPATIENT
Start: 2021-04-05 | End: 2021-04-05

## 2021-04-06 LAB — VIT B12 SERPL-MCNC: 593 PG/ML (ref 210–950)

## 2021-04-12 ENCOUNTER — TELEPHONE (OUTPATIENT)
Dept: FAMILY MEDICINE | Facility: HOSPITAL | Age: 36
End: 2021-04-12

## 2021-08-04 ENCOUNTER — LAB VISIT (OUTPATIENT)
Dept: LAB | Facility: HOSPITAL | Age: 36
End: 2021-08-04
Attending: STUDENT IN AN ORGANIZED HEALTH CARE EDUCATION/TRAINING PROGRAM
Payer: MEDICAID

## 2021-08-04 ENCOUNTER — OFFICE VISIT (OUTPATIENT)
Dept: FAMILY MEDICINE | Facility: HOSPITAL | Age: 36
End: 2021-08-04
Payer: MEDICAID

## 2021-08-04 VITALS
WEIGHT: 221.31 LBS | DIASTOLIC BLOOD PRESSURE: 83 MMHG | HEART RATE: 95 BPM | SYSTOLIC BLOOD PRESSURE: 119 MMHG | BODY MASS INDEX: 31.68 KG/M2 | HEIGHT: 70 IN

## 2021-08-04 DIAGNOSIS — E11.9 CONTROLLED TYPE 2 DIABETES MELLITUS WITHOUT COMPLICATION, WITHOUT LONG-TERM CURRENT USE OF INSULIN: ICD-10-CM

## 2021-08-04 DIAGNOSIS — E11.9 CONTROLLED TYPE 2 DIABETES MELLITUS WITHOUT COMPLICATION, WITHOUT LONG-TERM CURRENT USE OF INSULIN: Primary | ICD-10-CM

## 2021-08-04 LAB
ESTIMATED AVG GLUCOSE: 128 MG/DL (ref 68–131)
HBA1C MFR BLD: 6.1 % (ref 4–5.6)

## 2021-08-04 PROCEDURE — 83036 HEMOGLOBIN GLYCOSYLATED A1C: CPT | Performed by: STUDENT IN AN ORGANIZED HEALTH CARE EDUCATION/TRAINING PROGRAM

## 2021-08-04 PROCEDURE — 99213 OFFICE O/P EST LOW 20 MIN: CPT | Performed by: STUDENT IN AN ORGANIZED HEALTH CARE EDUCATION/TRAINING PROGRAM

## 2021-08-04 PROCEDURE — 36415 COLL VENOUS BLD VENIPUNCTURE: CPT | Performed by: STUDENT IN AN ORGANIZED HEALTH CARE EDUCATION/TRAINING PROGRAM

## 2021-08-04 RX ORDER — LANCETS
1 EACH MISCELLANEOUS 2 TIMES DAILY
Qty: 200 EACH | Refills: 3 | Status: SHIPPED | OUTPATIENT
Start: 2021-08-04

## 2021-08-04 RX ORDER — LANCING DEVICE
1 EACH MISCELLANEOUS 2 TIMES DAILY
Qty: 1 EACH | Refills: 0 | Status: SHIPPED | OUTPATIENT
Start: 2021-08-04 | End: 2021-10-26 | Stop reason: SDUPTHER

## 2021-08-04 RX ORDER — INSULIN PUMP SYRINGE, 3 ML
EACH MISCELLANEOUS
Qty: 1 EACH | Refills: 0 | Status: SHIPPED | OUTPATIENT
Start: 2021-08-04 | End: 2021-08-05 | Stop reason: SDUPTHER

## 2021-08-05 RX ORDER — INSULIN PUMP SYRINGE, 3 ML
EACH MISCELLANEOUS
Qty: 1 EACH | Refills: 0 | Status: SHIPPED | OUTPATIENT
Start: 2021-08-05 | End: 2022-08-05

## 2021-10-26 DIAGNOSIS — E11.65 TYPE 2 DIABETES MELLITUS WITH HYPERGLYCEMIA, WITHOUT LONG-TERM CURRENT USE OF INSULIN: ICD-10-CM

## 2021-10-26 DIAGNOSIS — E11.9 CONTROLLED TYPE 2 DIABETES MELLITUS WITHOUT COMPLICATION, WITHOUT LONG-TERM CURRENT USE OF INSULIN: ICD-10-CM

## 2021-10-26 RX ORDER — METFORMIN HYDROCHLORIDE 500 MG/1
1000 TABLET ORAL 2 TIMES DAILY WITH MEALS
Qty: 360 TABLET | Refills: 3 | Status: SHIPPED | OUTPATIENT
Start: 2021-10-26 | End: 2021-11-09 | Stop reason: SDUPTHER

## 2021-10-26 RX ORDER — PALIPERIDONE PALMITATE 234 MG/1.5ML
156 INJECTION INTRAMUSCULAR
OUTPATIENT
Start: 2021-10-26

## 2021-10-26 RX ORDER — VENLAFAXINE HYDROCHLORIDE 150 MG/1
150 CAPSULE, EXTENDED RELEASE ORAL DAILY
Refills: 5 | OUTPATIENT
Start: 2021-10-26

## 2021-10-26 RX ORDER — LANCING DEVICE
1 EACH MISCELLANEOUS 2 TIMES DAILY
Qty: 1 EACH | Refills: 0 | Status: SHIPPED | OUTPATIENT
Start: 2021-10-26 | End: 2022-10-26

## 2021-10-26 RX ORDER — CLOZAPINE 100 MG/1
100 TABLET ORAL NIGHTLY
OUTPATIENT
Start: 2021-10-26

## 2021-10-26 RX ORDER — PALIPERIDONE 9 MG/1
9 TABLET, EXTENDED RELEASE ORAL DAILY
Refills: 5 | OUTPATIENT
Start: 2021-10-26

## 2021-10-26 RX ORDER — LISINOPRIL 10 MG/1
10 TABLET ORAL DAILY
Qty: 90 TABLET | Refills: 3 | Status: SHIPPED | OUTPATIENT
Start: 2021-10-26 | End: 2021-12-13 | Stop reason: SDUPTHER

## 2021-10-26 RX ORDER — LANCETS 33 GAUGE
EACH MISCELLANEOUS
Qty: 100 EACH | Refills: 2 | Status: SHIPPED | OUTPATIENT
Start: 2021-10-26

## 2021-10-26 RX ORDER — ATORVASTATIN CALCIUM 20 MG/1
20 TABLET, FILM COATED ORAL DAILY
Qty: 90 TABLET | Refills: 3 | Status: SHIPPED | OUTPATIENT
Start: 2021-10-26 | End: 2021-11-09 | Stop reason: SDUPTHER

## 2021-10-26 RX ORDER — SODIUM FLUORIDE/POTASSIUM NIT 1.1 %-5 %
PASTE (ML) DENTAL
Refills: 0 | OUTPATIENT
Start: 2021-10-26

## 2021-10-26 RX ORDER — POTASSIUM CHLORIDE 750 MG/1
10 CAPSULE, EXTENDED RELEASE ORAL DAILY
Refills: 5 | OUTPATIENT
Start: 2021-10-26

## 2021-11-09 DIAGNOSIS — E11.65 TYPE 2 DIABETES MELLITUS WITH HYPERGLYCEMIA, WITHOUT LONG-TERM CURRENT USE OF INSULIN: ICD-10-CM

## 2021-11-09 RX ORDER — METFORMIN HYDROCHLORIDE 500 MG/1
1000 TABLET ORAL 2 TIMES DAILY WITH MEALS
Qty: 360 TABLET | Refills: 3 | Status: SHIPPED | OUTPATIENT
Start: 2021-11-09 | End: 2022-11-10 | Stop reason: SDUPTHER

## 2021-11-09 RX ORDER — ATORVASTATIN CALCIUM 20 MG/1
20 TABLET, FILM COATED ORAL DAILY
Qty: 90 TABLET | Refills: 3 | Status: SHIPPED | OUTPATIENT
Start: 2021-11-09 | End: 2022-11-10 | Stop reason: SDUPTHER

## 2021-12-13 DIAGNOSIS — E11.65 TYPE 2 DIABETES MELLITUS WITH HYPERGLYCEMIA, WITHOUT LONG-TERM CURRENT USE OF INSULIN: ICD-10-CM

## 2021-12-13 RX ORDER — LISINOPRIL 10 MG/1
10 TABLET ORAL DAILY
Qty: 90 TABLET | Refills: 3 | Status: SHIPPED | OUTPATIENT
Start: 2021-12-13 | End: 2022-11-10 | Stop reason: SDUPTHER

## 2022-05-04 ENCOUNTER — OFFICE VISIT (OUTPATIENT)
Dept: FAMILY MEDICINE | Facility: HOSPITAL | Age: 37
End: 2022-05-04
Attending: FAMILY MEDICINE
Payer: MEDICAID

## 2022-05-04 ENCOUNTER — LAB VISIT (OUTPATIENT)
Dept: LAB | Facility: HOSPITAL | Age: 37
End: 2022-05-04
Attending: FAMILY MEDICINE
Payer: MEDICAID

## 2022-05-04 VITALS
SYSTOLIC BLOOD PRESSURE: 125 MMHG | WEIGHT: 223.75 LBS | HEIGHT: 70 IN | HEART RATE: 99 BPM | BODY MASS INDEX: 32.03 KG/M2 | DIASTOLIC BLOOD PRESSURE: 87 MMHG

## 2022-05-04 DIAGNOSIS — E11.9 CONTROLLED TYPE 2 DIABETES MELLITUS WITHOUT COMPLICATION, WITHOUT LONG-TERM CURRENT USE OF INSULIN: Primary | ICD-10-CM

## 2022-05-04 DIAGNOSIS — E11.9 CONTROLLED TYPE 2 DIABETES MELLITUS WITHOUT COMPLICATION, WITHOUT LONG-TERM CURRENT USE OF INSULIN: ICD-10-CM

## 2022-05-04 PROCEDURE — 36415 COLL VENOUS BLD VENIPUNCTURE: CPT | Performed by: STUDENT IN AN ORGANIZED HEALTH CARE EDUCATION/TRAINING PROGRAM

## 2022-05-04 PROCEDURE — 80061 LIPID PANEL: CPT | Performed by: STUDENT IN AN ORGANIZED HEALTH CARE EDUCATION/TRAINING PROGRAM

## 2022-05-04 PROCEDURE — 99214 OFFICE O/P EST MOD 30 MIN: CPT | Performed by: STUDENT IN AN ORGANIZED HEALTH CARE EDUCATION/TRAINING PROGRAM

## 2022-05-04 PROCEDURE — 80053 COMPREHEN METABOLIC PANEL: CPT | Performed by: STUDENT IN AN ORGANIZED HEALTH CARE EDUCATION/TRAINING PROGRAM

## 2022-05-04 PROCEDURE — 83036 HEMOGLOBIN GLYCOSYLATED A1C: CPT | Performed by: STUDENT IN AN ORGANIZED HEALTH CARE EDUCATION/TRAINING PROGRAM

## 2022-05-04 NOTE — PROGRESS NOTES
Subjective:       Patient ID: Robert Callahan is a 36 y.o. male.    Chief Complaint: 6mnth follow up    HPI   37 yo M w/ pmhx of bipolar, schizoaffective, controlled DMII, HLD, obesity, presenting for f/u. He has been doing well. No complaints today. He is adherent to his medications for DMII and HLD. His fasting BG range 100-130s at home with an occasional reading in 140s range. ROS otherwise negative.    Review of Systems   Constitutional: Negative for chills, diaphoresis, fatigue and fever.   HENT: Negative for congestion, rhinorrhea, sore throat and trouble swallowing.    Eyes: Negative for itching and visual disturbance.   Respiratory: Negative for cough, chest tightness, shortness of breath and wheezing.    Cardiovascular: Negative for chest pain and palpitations.   Gastrointestinal: Negative for abdominal pain, constipation, diarrhea, nausea and vomiting.   Endocrine: Negative for polyphagia and polyuria.   Genitourinary: Negative for dysuria and flank pain.   Musculoskeletal: Negative for back pain, joint swelling and neck stiffness.   Skin: Negative for color change and rash.   Neurological: Negative for dizziness, weakness, light-headedness and headaches.   Psychiatric/Behavioral: Negative for confusion. The patient is not nervous/anxious.        Objective:      Vitals:    05/04/22 1616   BP: 125/87   Pulse: 99     Physical Exam  Constitutional:       General: He is not in acute distress.     Appearance: Normal appearance. He is well-developed. He is not diaphoretic.   HENT:      Head: Normocephalic and atraumatic.      Right Ear: External ear normal.      Left Ear: External ear normal.      Nose: Nose normal.      Mouth/Throat:      Mouth: Mucous membranes are moist.      Pharynx: Oropharynx is clear.   Eyes:      Extraocular Movements: Extraocular movements intact.      Conjunctiva/sclera: Conjunctivae normal.      Pupils: Pupils are equal, round, and reactive to light.   Cardiovascular:      Rate  and Rhythm: Normal rate and regular rhythm.      Pulses: Normal pulses.      Heart sounds: Normal heart sounds.   Pulmonary:      Effort: Pulmonary effort is normal. No respiratory distress.      Breath sounds: Normal breath sounds. No wheezing.   Abdominal:      General: Bowel sounds are normal.      Palpations: Abdomen is soft.      Tenderness: There is no abdominal tenderness.   Musculoskeletal:         General: Normal range of motion.      Cervical back: Normal range of motion and neck supple.   Skin:     General: Skin is warm and dry.      Capillary Refill: Capillary refill takes less than 2 seconds.      Findings: No erythema.   Neurological:      General: No focal deficit present.      Mental Status: He is alert and oriented to person, place, and time.   Psychiatric:         Mood and Affect: Mood normal.         Behavior: Behavior normal.         Assessment:       1. Controlled type 2 diabetes mellitus without complication, without long-term current use of insulin        Plan:       Controlled type 2 diabetes mellitus without complication, without long-term current use of insulin  -     Hemoglobin A1C; Future; Expected date: 05/04/2022  -     Lipid Panel; Future; Expected date: 05/04/2022  -     Comprehensive Metabolic Panel; Future; Expected date: 05/04/2022    Repeat labs today. Continue medications as prescribed. RTC 6 months.    No follow-ups on file.      Andres Venegas DO HO-3  [facility]

## 2022-05-05 LAB
ALBUMIN SERPL BCP-MCNC: 4.4 G/DL (ref 3.5–5.2)
ALP SERPL-CCNC: 65 U/L (ref 55–135)
ALT SERPL W/O P-5'-P-CCNC: 130 U/L (ref 10–44)
ANION GAP SERPL CALC-SCNC: 19 MMOL/L (ref 8–16)
AST SERPL-CCNC: 53 U/L (ref 10–40)
BILIRUB SERPL-MCNC: 0.5 MG/DL (ref 0.1–1)
BUN SERPL-MCNC: 10 MG/DL (ref 6–20)
CALCIUM SERPL-MCNC: 10.4 MG/DL (ref 8.7–10.5)
CHLORIDE SERPL-SCNC: 100 MMOL/L (ref 95–110)
CHOLEST SERPL-MCNC: 133 MG/DL (ref 120–199)
CHOLEST/HDLC SERPL: 4.8 {RATIO} (ref 2–5)
CO2 SERPL-SCNC: 24 MMOL/L (ref 23–29)
CREAT SERPL-MCNC: 0.9 MG/DL (ref 0.5–1.4)
EST. GFR  (AFRICAN AMERICAN): >60 ML/MIN/1.73 M^2
EST. GFR  (NON AFRICAN AMERICAN): >60 ML/MIN/1.73 M^2
ESTIMATED AVG GLUCOSE: 146 MG/DL (ref 68–131)
GLUCOSE SERPL-MCNC: 90 MG/DL (ref 70–110)
HBA1C MFR BLD: 6.7 % (ref 4–5.6)
HDLC SERPL-MCNC: 28 MG/DL (ref 40–75)
HDLC SERPL: 21.1 % (ref 20–50)
LDLC SERPL CALC-MCNC: 48.6 MG/DL (ref 63–159)
NONHDLC SERPL-MCNC: 105 MG/DL
POTASSIUM SERPL-SCNC: 3.7 MMOL/L (ref 3.5–5.1)
PROT SERPL-MCNC: 8 G/DL (ref 6–8.4)
SODIUM SERPL-SCNC: 143 MMOL/L (ref 136–145)
TRIGL SERPL-MCNC: 282 MG/DL (ref 30–150)

## 2022-11-10 ENCOUNTER — OFFICE VISIT (OUTPATIENT)
Dept: FAMILY MEDICINE | Facility: HOSPITAL | Age: 37
End: 2022-11-10
Payer: MEDICAID

## 2022-11-10 VITALS
SYSTOLIC BLOOD PRESSURE: 115 MMHG | DIASTOLIC BLOOD PRESSURE: 75 MMHG | WEIGHT: 223.13 LBS | HEART RATE: 101 BPM | BODY MASS INDEX: 31.94 KG/M2 | HEIGHT: 70 IN

## 2022-11-10 DIAGNOSIS — E11.65 TYPE 2 DIABETES MELLITUS WITH HYPERGLYCEMIA, WITHOUT LONG-TERM CURRENT USE OF INSULIN: ICD-10-CM

## 2022-11-10 PROCEDURE — 99214 OFFICE O/P EST MOD 30 MIN: CPT | Performed by: STUDENT IN AN ORGANIZED HEALTH CARE EDUCATION/TRAINING PROGRAM

## 2022-11-10 RX ORDER — METFORMIN HYDROCHLORIDE 500 MG/1
1000 TABLET ORAL 2 TIMES DAILY WITH MEALS
Qty: 360 TABLET | Refills: 3 | Status: SHIPPED | OUTPATIENT
Start: 2022-11-10 | End: 2023-11-20 | Stop reason: SDUPTHER

## 2022-11-10 RX ORDER — ATORVASTATIN CALCIUM 20 MG/1
20 TABLET, FILM COATED ORAL DAILY
Qty: 90 TABLET | Refills: 3 | Status: SHIPPED | OUTPATIENT
Start: 2022-11-10 | End: 2023-08-29 | Stop reason: SDUPTHER

## 2022-11-10 RX ORDER — LISINOPRIL 10 MG/1
10 TABLET ORAL DAILY
Qty: 90 TABLET | Refills: 3 | Status: SHIPPED | OUTPATIENT
Start: 2022-11-10 | End: 2023-08-29 | Stop reason: SDUPTHER

## 2022-11-10 NOTE — PROGRESS NOTES
[facility]  History & Physical    SUBJECTIVE:     Chief Complaint:   Chief Complaint   Patient presents with    Follow-up    Medication Refill       History of Present Illness:  36 y.o. male who  has no past medical history on file. presents to clinic today for Medication refill and diabetes management. Patient reports no complaints. No issues with current medications. Patient exercises for 30 minutes per day and test his glucose daily. Denies changes in vision, sensation, urination, or bowel habits.      Allergies:  Review of patient's allergies indicates:   Allergen Reactions    Penicillin        Home Medications:  Current Outpatient Medications on File Prior to Visit   Medication Sig    cloZAPine (CLOZARIL) 100 MG Tab Take 100 mg by mouth every evening.     INVEGA SUSTENNA 234 mg/1.5 mL Syrg injection 156 mg. Inject 234mg every 28days    paliperidone (INVEGA) 9 MG TR24 Take 9 mg by mouth once daily.    potassium chloride (MICRO-K) 10 MEQ CpSR Take 10 mEq by mouth once daily.    PREVIDENT 5000 SENSITIVE 1.1-5 % Pste U UTD    venlafaxine (EFFEXOR-XR) 150 MG Cp24 Take 150 mg by mouth once daily.    [DISCONTINUED] lisinopriL 10 MG tablet Take 1 tablet (10 mg total) by mouth once daily.    amoxicillin (AMOXIL) 500 MG Tab DNT    blood-glucose meter kit Use as instructed    lancets (LANCETS,ULTRA THIN) Misc 1 each by Misc.(Non-Drug; Combo Route) route 2 (two) times a day. (Patient not taking: Reported on 11/10/2022)    lancing device Misc 1 each by Misc.(Non-Drug; Combo Route) route 2 (two) times a day.    ONETOUCH DELICA PLUS LANCET 33 gauge Misc USE UTD BEFORE BREAKFAST (Patient not taking: Reported on 11/10/2022)    [DISCONTINUED] atorvastatin (LIPITOR) 20 MG tablet Take 1 tablet (20 mg total) by mouth once daily.    [DISCONTINUED] metFORMIN (GLUCOPHAGE) 500 MG tablet Take 2 tablets (1,000 mg total) by mouth 2 (two) times daily with meals.     No current facility-administered medications on file prior to  visit.       No past medical history on file.  No past surgical history on file.  No family history on file.  Social History     Tobacco Use    Smoking status: Never   Substance Use Topics    Alcohol use: No        Review of Systems   Constitutional:  Negative for fever and weight loss.   HENT:  Negative for hearing loss and sore throat.    Eyes:  Negative for blurred vision.   Respiratory:  Negative for cough, shortness of breath and wheezing.    Cardiovascular:  Negative for chest pain and leg swelling.   Gastrointestinal:  Negative for heartburn, nausea and vomiting.   Genitourinary:  Negative for dysuria.   Musculoskeletal:  Negative for back pain, joint pain and myalgias.   Neurological:  Negative for dizziness, weakness and headaches.      OBJECTIVE:     Vital Signs:  Pulse: 101 (11/10/22 1403)  BP: 115/75 (11/10/22 1403)    Physical Exam  Constitutional:       General: He is not in acute distress.     Appearance: Normal appearance. He is not ill-appearing or diaphoretic.   HENT:      Head: Normocephalic and atraumatic.      Right Ear: External ear normal.      Left Ear: External ear normal.      Nose: Nose normal.      Mouth/Throat:      Mouth: Mucous membranes are moist.   Eyes:      Extraocular Movements: Extraocular movements intact.   Cardiovascular:      Rate and Rhythm: Normal rate and regular rhythm.      Pulses: Normal pulses.      Heart sounds: Normal heart sounds.   Pulmonary:      Effort: Pulmonary effort is normal.   Abdominal:      General: Abdomen is flat.      Tenderness: There is no guarding.   Musculoskeletal:         General: No swelling. Normal range of motion.   Skin:     General: Skin is warm and dry.   Neurological:      Mental Status: He is alert and oriented to person, place, and time. Mental status is at baseline.   Psychiatric:         Mood and Affect: Mood normal.         Behavior: Behavior normal.     Laboratory:  Hemoglobin A1C   Date Value Ref Range Status   11/10/2022 6.7 (H)  4.0 - 5.6 % Final     Comment:     ADA Screening Guidelines:  5.7-6.4%  Consistent with prediabetes  >or=6.5%  Consistent with diabetes    High levels of fetal hemoglobin interfere with the HbA1C  assay. Heterozygous hemoglobin variants (HbS, HgC, etc)do  not significantly interfere with this assay.   However, presence of multiple variants may affect accuracy.     05/04/2022 6.7 (H) 4.0 - 5.6 % Final     Comment:     ADA Screening Guidelines:  5.7-6.4%  Consistent with prediabetes  >or=6.5%  Consistent with diabetes    High levels of fetal hemoglobin interfere with the HbA1C  assay. Heterozygous hemoglobin variants (HbS, HgC, etc)do  not significantly interfere with this assay.   However, presence of multiple variants may affect accuracy.     08/04/2021 6.1 (H) 4.0 - 5.6 % Final     Comment:     ADA Screening Guidelines:  5.7-6.4%  Consistent with prediabetes  >or=6.5%  Consistent with diabetes    High levels of fetal hemoglobin interfere with the HbA1C  assay. Heterozygous hemoglobin variants (HbS, HgC, etc)do  not significantly interfere with this assay.   However, presence of multiple variants may affect accuracy.         A/P:  Robert was seen today for follow-up and medication refill. Refills provided. A1c ordered to assess for need to change current regimen. Recommended continued healthy diet and exercises to help with control of elevated triglycerides.     Diagnoses and all orders for this visit:    Type 2 diabetes mellitus with hyperglycemia, without long-term current use of insulin  -     Hemoglobin A1C; Future  -     atorvastatin (LIPITOR) 20 MG tablet; Take 1 tablet (20 mg total) by mouth once daily.  -     lisinopriL 10 MG tablet; Take 1 tablet (10 mg total) by mouth once daily.  -     metFORMIN (GLUCOPHAGE) 500 MG tablet; Take 2 tablets (1,000 mg total) by mouth 2 (two) times daily with meals.      Follow up in about 6 months (around 5/10/2023).      Roman Serna MD  [facility]  PGY-2  11/10/2022

## 2022-11-15 NOTE — PROGRESS NOTES
I have reviewed the notes, assessments, and/or procedures performed by Dr. Serna, I concur with her/his documentation of Robert Callahan. DM fair control

## 2023-06-26 ENCOUNTER — OFFICE VISIT (OUTPATIENT)
Dept: FAMILY MEDICINE | Facility: HOSPITAL | Age: 38
End: 2023-06-26
Payer: MEDICAID

## 2023-06-26 VITALS
DIASTOLIC BLOOD PRESSURE: 83 MMHG | HEIGHT: 70 IN | WEIGHT: 218.25 LBS | HEART RATE: 102 BPM | SYSTOLIC BLOOD PRESSURE: 127 MMHG | BODY MASS INDEX: 31.25 KG/M2

## 2023-06-26 DIAGNOSIS — E11.65 TYPE 2 DIABETES MELLITUS WITH HYPERGLYCEMIA, WITHOUT LONG-TERM CURRENT USE OF INSULIN: Primary | ICD-10-CM

## 2023-06-26 PROCEDURE — 99214 OFFICE O/P EST MOD 30 MIN: CPT | Performed by: STUDENT IN AN ORGANIZED HEALTH CARE EDUCATION/TRAINING PROGRAM

## 2023-06-26 NOTE — PROGRESS NOTES
[facility]  History & Physical    SUBJECTIVE:     Chief Complaint:   Chief Complaint   Patient presents with    Follow-up     6 months       History of Present Illness:  37 y.o. male who  has no past medical history on file. presents to clinic today for follow up for diabetes. Patient is doing well. Patient is accompanied by his father. Patient has log of home glucose checks which appear to average around 130.       Allergies:  Review of patient's allergies indicates:   Allergen Reactions    Penicillin        Home Medications:  Current Outpatient Medications on File Prior to Visit   Medication Sig    atorvastatin (LIPITOR) 20 MG tablet Take 1 tablet (20 mg total) by mouth once daily.    cloZAPine (CLOZARIL) 100 MG Tab Take 100 mg by mouth every evening.     INVEGA SUSTENNA 234 mg/1.5 mL Syrg injection 156 mg. Inject 234mg every 28days    lancets (LANCETS,ULTRA THIN) Misc 1 each by Misc.(Non-Drug; Combo Route) route 2 (two) times a day.    lisinopriL 10 MG tablet Take 1 tablet (10 mg total) by mouth once daily.    metFORMIN (GLUCOPHAGE) 500 MG tablet Take 2 tablets (1,000 mg total) by mouth 2 (two) times daily with meals.    ONETOUCH DELICA PLUS LANCET 33 gauge Misc USE UTD BEFORE BREAKFAST    potassium chloride (MICRO-K) 10 MEQ CpSR Take 10 mEq by mouth once daily.    PREVIDENT 5000 SENSITIVE 1.1-5 % Pste U UTD    venlafaxine (EFFEXOR-XR) 150 MG Cp24 Take 150 mg by mouth once daily.    amoxicillin (AMOXIL) 500 MG Tab DNT    blood-glucose meter kit Use as instructed    lancing device Misc 1 each by Misc.(Non-Drug; Combo Route) route 2 (two) times a day.    paliperidone (INVEGA) 9 MG TR24 Take 9 mg by mouth once daily.     No current facility-administered medications on file prior to visit.       No past medical history on file.  No past surgical history on file.  No family history on file.  Social History     Tobacco Use    Smoking status: Never   Substance Use Topics    Alcohol use: No        Review of  Systems   Constitutional:  Negative for fever and weight loss.   HENT:  Negative for hearing loss and sore throat.    Eyes:  Negative for blurred vision.   Respiratory:  Negative for cough, shortness of breath and wheezing.    Cardiovascular:  Negative for chest pain and leg swelling.   Gastrointestinal:  Negative for heartburn, nausea and vomiting.   Genitourinary:  Negative for dysuria.   Musculoskeletal:  Negative for back pain, joint pain and myalgias.   Neurological:  Negative for dizziness, weakness and headaches.      OBJECTIVE:     Vital Signs:  Pulse: 102 (06/26/23 1544)  BP: 127/83 (06/26/23 1544)  Body mass index is 31.32 kg/m².  Physical Exam  Constitutional:       General: He is not in acute distress.     Appearance: Normal appearance. He is not ill-appearing or diaphoretic.   HENT:      Head: Normocephalic and atraumatic.      Right Ear: External ear normal.      Left Ear: External ear normal.      Nose: Nose normal.      Mouth/Throat:      Mouth: Mucous membranes are moist.   Eyes:      Extraocular Movements: Extraocular movements intact.   Cardiovascular:      Rate and Rhythm: Normal rate and regular rhythm.      Pulses: Normal pulses.      Heart sounds: Normal heart sounds.   Pulmonary:      Effort: No respiratory distress.      Breath sounds: No wheezing.   Abdominal:      General: Abdomen is flat.      Tenderness: There is no guarding.   Musculoskeletal:         General: No swelling. Normal range of motion.   Skin:     General: Skin is warm and dry.   Neurological:      Mental Status: He is alert and oriented to person, place, and time.   Psychiatric:         Mood and Affect: Mood normal.         Behavior: Behavior normal.       Laboratory:  Hemoglobin A1C   Date Value Ref Range Status   06/26/2023 6.9 (H) 4.0 - 5.6 % Final     Comment:     ADA Screening Guidelines:  5.7-6.4%  Consistent with prediabetes  >or=6.5%  Consistent with diabetes    High levels of fetal hemoglobin interfere with the  HbA1C  assay. Heterozygous hemoglobin variants (HbS, HgC, etc)do  not significantly interfere with this assay.   However, presence of multiple variants may affect accuracy.     11/10/2022 6.7 (H) 4.0 - 5.6 % Final     Comment:     ADA Screening Guidelines:  5.7-6.4%  Consistent with prediabetes  >or=6.5%  Consistent with diabetes    High levels of fetal hemoglobin interfere with the HbA1C  assay. Heterozygous hemoglobin variants (HbS, HgC, etc)do  not significantly interfere with this assay.   However, presence of multiple variants may affect accuracy.     05/04/2022 6.7 (H) 4.0 - 5.6 % Final     Comment:     ADA Screening Guidelines:  5.7-6.4%  Consistent with prediabetes  >or=6.5%  Consistent with diabetes    High levels of fetal hemoglobin interfere with the HbA1C  assay. Heterozygous hemoglobin variants (HbS, HgC, etc)do  not significantly interfere with this assay.   However, presence of multiple variants may affect accuracy.         A/P:  Robert was seen today for follow-up. Will continue current therapies. Health maintenance labs ordered. Patient declines the Covid and pneumonia vaccine at this time. Will consider receiving the Tdap at his pharmacy.    Diagnoses and all orders for this visit:    Type 2 diabetes mellitus with hyperglycemia, without long-term current use of insulin  -     Lipid Panel; Future  -     Hemoglobin A1C; Future  -     HIV 1/2 Ag/Ab (4th Gen); Future  -     Hepatitis C Antibody; Future  -     Microalbumin/creatinine urine ratio; Future        Follow up in about 3 months (around 9/26/2023).      Roman Serna MD  [facility] PGY-2  06/27/2023

## 2023-08-29 DIAGNOSIS — E11.65 TYPE 2 DIABETES MELLITUS WITH HYPERGLYCEMIA, WITHOUT LONG-TERM CURRENT USE OF INSULIN: ICD-10-CM

## 2023-08-29 RX ORDER — LISINOPRIL 10 MG/1
10 TABLET ORAL DAILY
Qty: 90 TABLET | Refills: 3 | Status: SHIPPED | OUTPATIENT
Start: 2023-08-29 | End: 2024-08-28

## 2023-08-29 RX ORDER — ATORVASTATIN CALCIUM 20 MG/1
20 TABLET, FILM COATED ORAL DAILY
Qty: 90 TABLET | Refills: 3 | Status: SHIPPED | OUTPATIENT
Start: 2023-08-29 | End: 2023-10-24

## 2023-10-12 NOTE — PROGRESS NOTES
Case discussed with resident at time of visit.  I have reviewed and concur with the resident's evaluation, assessment, and plan.     Benzoyl Peroxide Pregnancy And Lactation Text: This medication is Pregnancy Category C. It is unknown if benzoyl peroxide is excreted in breast milk.

## 2023-10-24 ENCOUNTER — OFFICE VISIT (OUTPATIENT)
Dept: FAMILY MEDICINE | Facility: HOSPITAL | Age: 38
End: 2023-10-24
Payer: MEDICAID

## 2023-10-24 VITALS
HEART RATE: 84 BPM | BODY MASS INDEX: 30.36 KG/M2 | HEIGHT: 70 IN | WEIGHT: 212.06 LBS | DIASTOLIC BLOOD PRESSURE: 81 MMHG | SYSTOLIC BLOOD PRESSURE: 120 MMHG

## 2023-10-24 DIAGNOSIS — E78.2 MODERATE MIXED HYPERLIPIDEMIA NOT REQUIRING STATIN THERAPY: ICD-10-CM

## 2023-10-24 DIAGNOSIS — E11.65 TYPE 2 DIABETES MELLITUS WITH HYPERGLYCEMIA, WITHOUT LONG-TERM CURRENT USE OF INSULIN: Primary | ICD-10-CM

## 2023-10-24 PROCEDURE — 99214 OFFICE O/P EST MOD 30 MIN: CPT | Performed by: STUDENT IN AN ORGANIZED HEALTH CARE EDUCATION/TRAINING PROGRAM

## 2023-10-24 RX ORDER — ATORVASTATIN CALCIUM 40 MG/1
40 TABLET, FILM COATED ORAL DAILY
Qty: 90 TABLET | Refills: 3 | Status: SHIPPED | OUTPATIENT
Start: 2023-10-24 | End: 2024-10-23

## 2023-10-24 NOTE — PROGRESS NOTES
[facility]  History & Physical    SUBJECTIVE:     Chief Complaint:   Chief Complaint   Patient presents with    Diabetes       History of Present Illness:  37 y.o. male who  has no past medical history on file. presents to clinic today for diabetes and hyperlipidemia. Patient reports that his fasting glucose has been greater than 200 for the past few weeks. Patient does eat a high carb diet with simple sugars and carbs present at most meals. Patient test glucose in AM only. Patient has been well controlled on metformin only previously.       Allergies:  Review of patient's allergies indicates:   Allergen Reactions    Penicillin        Home Medications:  Current Outpatient Medications on File Prior to Visit   Medication Sig    cloZAPine (CLOZARIL) 100 MG Tab Take 100 mg by mouth every evening.     INVEGA SUSTENNA 234 mg/1.5 mL Syrg injection 156 mg. Inject 234mg every 28days    lancets (LANCETS,ULTRA THIN) Misc 1 each by Misc.(Non-Drug; Combo Route) route 2 (two) times a day.    lisinopriL 10 MG tablet Take 1 tablet (10 mg total) by mouth once daily.    metFORMIN (GLUCOPHAGE) 500 MG tablet Take 2 tablets (1,000 mg total) by mouth 2 (two) times daily with meals.    ONETOUCH DELICA PLUS LANCET 33 gauge Misc USE UTD BEFORE BREAKFAST    potassium chloride (MICRO-K) 10 MEQ CpSR Take 10 mEq by mouth once daily.    PREVIDENT 5000 SENSITIVE 1.1-5 % Pste U UTD    venlafaxine (EFFEXOR-XR) 150 MG Cp24 Take 150 mg by mouth once daily.    [DISCONTINUED] atorvastatin (LIPITOR) 20 MG tablet Take 1 tablet (20 mg total) by mouth once daily.    amoxicillin (AMOXIL) 500 MG Tab DNT    blood-glucose meter kit Use as instructed    lancing device Misc 1 each by Misc.(Non-Drug; Combo Route) route 2 (two) times a day.    paliperidone (INVEGA) 9 MG TR24 Take 9 mg by mouth once daily.     No current facility-administered medications on file prior to visit.       No past medical history on file.  No past surgical history on file.  No  family history on file.  Social History     Tobacco Use    Smoking status: Never   Substance Use Topics    Alcohol use: No        Review of Systems   Constitutional:  Negative for fever and weight loss.   HENT:  Negative for hearing loss and sore throat.    Eyes:  Negative for blurred vision.   Respiratory:  Negative for cough, shortness of breath and wheezing.    Cardiovascular:  Negative for chest pain and leg swelling.   Gastrointestinal:  Negative for heartburn, nausea and vomiting.   Genitourinary:  Negative for dysuria.   Musculoskeletal:  Negative for back pain, joint pain and myalgias.   Neurological:  Negative for dizziness, weakness and headaches.        OBJECTIVE:     Vital Signs:  Pulse: 84 (10/24/23 1041)  BP: 120/81 (10/24/23 1041)  Body mass index is 30.43 kg/m².  Physical Exam  Constitutional:       General: He is not in acute distress.     Appearance: Normal appearance. He is obese. He is not ill-appearing or diaphoretic.   HENT:      Head: Normocephalic and atraumatic.      Right Ear: External ear normal.      Left Ear: External ear normal.      Nose: Nose normal.      Mouth/Throat:      Mouth: Mucous membranes are moist.   Eyes:      Extraocular Movements: Extraocular movements intact.   Cardiovascular:      Rate and Rhythm: Normal rate and regular rhythm.      Pulses: Normal pulses.      Heart sounds: Normal heart sounds.   Pulmonary:      Effort: No respiratory distress.      Breath sounds: No wheezing.   Abdominal:      General: Abdomen is flat.      Tenderness: There is no guarding.   Musculoskeletal:         General: No swelling. Normal range of motion.   Skin:     General: Skin is warm and dry.   Neurological:      Mental Status: He is alert and oriented to person, place, and time.   Psychiatric:         Mood and Affect: Mood normal.         Behavior: Behavior normal.         Laboratory:  Hemoglobin A1C   Date Value Ref Range Status   06/26/2023 6.9 (H) 4.0 - 5.6 % Final     Comment:     ADA  Screening Guidelines:  5.7-6.4%  Consistent with prediabetes  >or=6.5%  Consistent with diabetes    High levels of fetal hemoglobin interfere with the HbA1C  assay. Heterozygous hemoglobin variants (HbS, HgC, etc)do  not significantly interfere with this assay.   However, presence of multiple variants may affect accuracy.     11/10/2022 6.7 (H) 4.0 - 5.6 % Final     Comment:     ADA Screening Guidelines:  5.7-6.4%  Consistent with prediabetes  >or=6.5%  Consistent with diabetes    High levels of fetal hemoglobin interfere with the HbA1C  assay. Heterozygous hemoglobin variants (HbS, HgC, etc)do  not significantly interfere with this assay.   However, presence of multiple variants may affect accuracy.     05/04/2022 6.7 (H) 4.0 - 5.6 % Final     Comment:     ADA Screening Guidelines:  5.7-6.4%  Consistent with prediabetes  >or=6.5%  Consistent with diabetes    High levels of fetal hemoglobin interfere with the HbA1C  assay. Heterozygous hemoglobin variants (HbS, HgC, etc)do  not significantly interfere with this assay.   However, presence of multiple variants may affect accuracy.         A/P:  Robert was seen today for diabetes. Suspect worsening of insulin resistance combined with high carbohydrate diet as cause of worsened fasting glucose. Discussed diabetic diet and lifestyle changes. Given recent blood work. Will increase dose of atorvastatin to address triglycerides. Will begin jardiance as will in addition to lifestyle changes.     Diagnoses and all orders for this visit:    Type 2 diabetes mellitus with hyperglycemia, without long-term current use of insulin  -     Hemoglobin A1C; Future  -     atorvastatin (LIPITOR) 40 MG tablet; Take 1 tablet (40 mg total) by mouth once daily.  -     empagliflozin (JARDIANCE) 10 mg tablet; Take 1 tablet (10 mg total) by mouth once daily.    Moderate mixed hyperlipidemia not requiring statin therapy  -     Triglycerides; Future  -     atorvastatin (LIPITOR) 40 MG tablet;  Take 1 tablet (40 mg total) by mouth once daily.        Follow up in about 3 months (around 1/24/2024).      Roman Serna MD  [facility] PGY-3  10/24/2023

## 2023-10-25 NOTE — PROGRESS NOTES
I assume primary medical responsibility for this patient. I have reviewed the history, physical, and assessment & treatment plan with the resident and agree that the care is reasonable and necessary. This service has been performed by a resident without the presence of a teaching physician under the primary care exception. If necessary, an addendum of additional findings or evaluation beyond the resident documentation will be noted below.        Ernst Anand Jr., DO    [facility]

## 2023-11-20 DIAGNOSIS — E11.65 TYPE 2 DIABETES MELLITUS WITH HYPERGLYCEMIA, WITHOUT LONG-TERM CURRENT USE OF INSULIN: ICD-10-CM

## 2023-11-21 RX ORDER — METFORMIN HYDROCHLORIDE 500 MG/1
1000 TABLET ORAL 2 TIMES DAILY WITH MEALS
Qty: 360 TABLET | Refills: 3 | Status: SHIPPED | OUTPATIENT
Start: 2023-11-21 | End: 2024-11-20

## 2024-01-04 ENCOUNTER — TELEPHONE (OUTPATIENT)
Dept: FAMILY MEDICINE | Facility: HOSPITAL | Age: 39
End: 2024-01-04
Payer: MEDICAID

## 2024-01-04 NOTE — TELEPHONE ENCOUNTER
Msg left:  We will contact as soon as the schedules are available. ----- Message from Radha Garza sent at 1/4/2024 11:42 AM CST -----  Regarding: appointment  Type:  Appointment     Who Called: pt  Would the patient rather a call back or a response via [facility]? Call  Best Call Back Number: [phone]  Additional Information: pt would like to schedule 3 month follow up

## 2024-01-10 ENCOUNTER — PATIENT MESSAGE (OUTPATIENT)
Dept: FAMILY MEDICINE | Facility: HOSPITAL | Age: 39
End: 2024-01-10
Payer: MEDICAID

## 2024-01-18 DIAGNOSIS — E11.65 TYPE 2 DIABETES MELLITUS WITH HYPERGLYCEMIA, WITHOUT LONG-TERM CURRENT USE OF INSULIN: ICD-10-CM

## 2024-01-18 NOTE — TELEPHONE ENCOUNTER
----- Message from Roman Patiño sent at 1/18/2024  3:18 PM CST -----  Contact: Pt  .Type:  Needs Medical Advice    Who Called: pt    Pharmacy name and phone #:  [facility] - [place] [address]   Phone: [phone]  Fax: [phone]  Would the patient rather a call back or a response via [facility]?  Call back  Best Call Back Number: [phone]  Additional Information: Pt. Is requesting a refill on his empagliflozin (JARDIANCE) 10 mg tablet

## 2024-01-29 ENCOUNTER — OFFICE VISIT (OUTPATIENT)
Dept: FAMILY MEDICINE | Facility: HOSPITAL | Age: 39
End: 2024-01-29
Payer: MEDICAID

## 2024-01-29 VITALS
HEIGHT: 70 IN | BODY MASS INDEX: 29.89 KG/M2 | DIASTOLIC BLOOD PRESSURE: 82 MMHG | SYSTOLIC BLOOD PRESSURE: 113 MMHG | HEART RATE: 101 BPM | WEIGHT: 208.75 LBS

## 2024-01-29 DIAGNOSIS — E78.2 MODERATE MIXED HYPERLIPIDEMIA NOT REQUIRING STATIN THERAPY: Primary | ICD-10-CM

## 2024-01-29 DIAGNOSIS — E11.65 TYPE 2 DIABETES MELLITUS WITH HYPERGLYCEMIA, WITHOUT LONG-TERM CURRENT USE OF INSULIN: ICD-10-CM

## 2024-01-29 PROCEDURE — 99214 OFFICE O/P EST MOD 30 MIN: CPT | Performed by: STUDENT IN AN ORGANIZED HEALTH CARE EDUCATION/TRAINING PROGRAM

## 2024-01-29 NOTE — PROGRESS NOTES
[facility]  History & Physical    SUBJECTIVE:     Chief Complaint:   Chief Complaint   Patient presents with    Follow-up       History of Present Illness:  38 y.o. male who  has no past medical history on file. presents to clinic today for DMT2 and HLD. Patient reports some increased urination with jardiance but denies dysuria, nausea and vomiting. Patient compliant with all medications.       Allergies:  Review of patient's allergies indicates:   Allergen Reactions    Penicillin        Home Medications:  Current Outpatient Medications on File Prior to Visit   Medication Sig    amoxicillin (AMOXIL) 500 MG Tab DNT    atorvastatin (LIPITOR) 40 MG tablet Take 1 tablet (40 mg total) by mouth once daily.    cloZAPine (CLOZARIL) 100 MG Tab Take 100 mg by mouth every evening.     empagliflozin (JARDIANCE) 10 mg tablet Take 1 tablet (10 mg total) by mouth once daily.    INVEGA SUSTENNA 234 mg/1.5 mL Syrg injection 156 mg. Inject 234mg every 28days    lancets (LANCETS,ULTRA THIN) Misc 1 each by Misc.(Non-Drug; Combo Route) route 2 (two) times a day.    lisinopriL 10 MG tablet Take 1 tablet (10 mg total) by mouth once daily.    metFORMIN (GLUCOPHAGE) 500 MG tablet Take 2 tablets (1,000 mg total) by mouth 2 (two) times daily with meals.    ONETOUCH DELICA PLUS LANCET 33 gauge Misc USE UTD BEFORE BREAKFAST    potassium chloride (MICRO-K) 10 MEQ CpSR Take 10 mEq by mouth once daily.    PREVIDENT 5000 SENSITIVE 1.1-5 % Pste U UTD    venlafaxine (EFFEXOR-XR) 150 MG Cp24 Take 150 mg by mouth once daily.    blood-glucose meter kit Use as instructed    lancing device Misc 1 each by Misc.(Non-Drug; Combo Route) route 2 (two) times a day.    paliperidone (INVEGA) 9 MG TR24 Take 9 mg by mouth once daily.     No current facility-administered medications on file prior to visit.       No past medical history on file.  No past surgical history on file.  No family history on file.  Social History     Tobacco Use    Smoking status:  Never   Substance Use Topics    Alcohol use: No        Review of Systems   Constitutional:  Negative for fever and weight loss.   HENT:  Negative for hearing loss and sore throat.    Eyes:  Negative for blurred vision.   Respiratory:  Negative for cough, shortness of breath and wheezing.    Cardiovascular:  Negative for chest pain and leg swelling.   Gastrointestinal:  Negative for heartburn, nausea and vomiting.   Genitourinary:  Positive for frequency. Negative for dysuria.   Musculoskeletal:  Negative for back pain, joint pain and myalgias.   Neurological:  Negative for dizziness, weakness and headaches.        OBJECTIVE:     Vital Signs:  Pulse: 101 (01/29/24 1410)  BP: 113/82 (01/29/24 1410)  Body mass index is 29.96 kg/m².  Physical Exam  Constitutional:       General: He is not in acute distress.     Appearance: Normal appearance. He is not ill-appearing or diaphoretic.   HENT:      Head: Normocephalic and atraumatic.      Right Ear: External ear normal.      Left Ear: External ear normal.      Nose: Nose normal.      Mouth/Throat:      Mouth: Mucous membranes are moist.   Eyes:      Extraocular Movements: Extraocular movements intact.   Cardiovascular:      Rate and Rhythm: Normal rate and regular rhythm.      Pulses: Normal pulses.      Heart sounds: Normal heart sounds.   Pulmonary:      Effort: No respiratory distress.      Breath sounds: No wheezing.   Abdominal:      General: Abdomen is flat.      Tenderness: There is no guarding.   Musculoskeletal:         General: No swelling. Normal range of motion.   Skin:     General: Skin is warm and dry.   Neurological:      Mental Status: He is alert and oriented to person, place, and time.   Psychiatric:         Mood and Affect: Mood normal.         Behavior: Behavior normal.         Laboratory:  Hemoglobin A1C   Date Value Ref Range Status   10/24/2023 7.7 (H) 4.0 - 5.6 % Final     Comment:     ADA Screening Guidelines:  5.7-6.4%  Consistent with  prediabetes  >or=6.5%  Consistent with diabetes    High levels of fetal hemoglobin interfere with the HbA1C  assay. Heterozygous hemoglobin variants (HbS, HgC, etc)do  not significantly interfere with this assay.   However, presence of multiple variants may affect accuracy.     06/26/2023 6.9 (H) 4.0 - 5.6 % Final     Comment:     ADA Screening Guidelines:  5.7-6.4%  Consistent with prediabetes  >or=6.5%  Consistent with diabetes    High levels of fetal hemoglobin interfere with the HbA1C  assay. Heterozygous hemoglobin variants (HbS, HgC, etc)do  not significantly interfere with this assay.   However, presence of multiple variants may affect accuracy.     11/10/2022 6.7 (H) 4.0 - 5.6 % Final     Comment:     ADA Screening Guidelines:  5.7-6.4%  Consistent with prediabetes  >or=6.5%  Consistent with diabetes    High levels of fetal hemoglobin interfere with the HbA1C  assay. Heterozygous hemoglobin variants (HbS, HgC, etc)do  not significantly interfere with this assay.   However, presence of multiple variants may affect accuracy.         A/P:  Robert was seen today for follow-up. Chronic conditions assessed at this visit appear controlled.     Diagnoses and all orders for this visit:    Moderate mixed hyperlipidemia not requiring statin therapy  -     Lipid Panel; Future       -     Adjust atorvastatin based on fasting lipid panel  Type 2 diabetes mellitus with hyperglycemia, without long-term current use of insulin       - Controlled based on testing logs      -  Continue regimen of Metformin and Jardiance      Follow up in about 4 months (around 5/15/2024).      Roman Serna MD  [facility] PGY-3  01/29/2024

## 2024-01-30 ENCOUNTER — LAB VISIT (OUTPATIENT)
Dept: LAB | Facility: HOSPITAL | Age: 39
End: 2024-01-30
Attending: STUDENT IN AN ORGANIZED HEALTH CARE EDUCATION/TRAINING PROGRAM
Payer: MEDICAID

## 2024-01-30 DIAGNOSIS — E78.2 MODERATE MIXED HYPERLIPIDEMIA NOT REQUIRING STATIN THERAPY: ICD-10-CM

## 2024-01-30 LAB
CHOLEST SERPL-MCNC: 115 MG/DL (ref 120–199)
CHOLEST/HDLC SERPL: 3.8 {RATIO} (ref 2–5)
HDLC SERPL-MCNC: 30 MG/DL (ref 40–75)
HDLC SERPL: 26.1 % (ref 20–50)
LDLC SERPL CALC-MCNC: 60 MG/DL (ref 63–159)
NONHDLC SERPL-MCNC: 85 MG/DL
TRIGL SERPL-MCNC: 125 MG/DL (ref 30–150)

## 2024-01-30 PROCEDURE — 80061 LIPID PANEL: CPT | Performed by: STUDENT IN AN ORGANIZED HEALTH CARE EDUCATION/TRAINING PROGRAM

## 2024-01-30 PROCEDURE — 36415 COLL VENOUS BLD VENIPUNCTURE: CPT | Performed by: STUDENT IN AN ORGANIZED HEALTH CARE EDUCATION/TRAINING PROGRAM

## 2024-04-16 DIAGNOSIS — E11.65 TYPE 2 DIABETES MELLITUS WITH HYPERGLYCEMIA, WITHOUT LONG-TERM CURRENT USE OF INSULIN: ICD-10-CM

## 2024-05-13 ENCOUNTER — OFFICE VISIT (OUTPATIENT)
Dept: FAMILY MEDICINE | Facility: HOSPITAL | Age: 39
End: 2024-05-13
Payer: MEDICAID

## 2024-05-13 VITALS
SYSTOLIC BLOOD PRESSURE: 126 MMHG | DIASTOLIC BLOOD PRESSURE: 87 MMHG | BODY MASS INDEX: 30.42 KG/M2 | HEIGHT: 70 IN | HEART RATE: 101 BPM | WEIGHT: 212.5 LBS

## 2024-05-13 DIAGNOSIS — Z28.21 REFUSED PNEUMOCOCCAL VACCINE: Primary | ICD-10-CM

## 2024-05-13 DIAGNOSIS — E11.65 TYPE 2 DIABETES MELLITUS WITH HYPERGLYCEMIA, WITHOUT LONG-TERM CURRENT USE OF INSULIN: ICD-10-CM

## 2024-05-13 DIAGNOSIS — E78.2 MODERATE MIXED HYPERLIPIDEMIA NOT REQUIRING STATIN THERAPY: ICD-10-CM

## 2024-05-13 PROCEDURE — 99214 OFFICE O/P EST MOD 30 MIN: CPT | Performed by: STUDENT IN AN ORGANIZED HEALTH CARE EDUCATION/TRAINING PROGRAM

## 2024-05-13 RX ORDER — ATORVASTATIN CALCIUM 40 MG/1
40 TABLET, FILM COATED ORAL DAILY
Qty: 90 TABLET | Refills: 3 | Status: SHIPPED | OUTPATIENT
Start: 2024-05-13 | End: 2025-05-13

## 2024-05-13 RX ORDER — METFORMIN HYDROCHLORIDE 500 MG/1
1000 TABLET ORAL 2 TIMES DAILY WITH MEALS
Qty: 360 TABLET | Refills: 3 | Status: SHIPPED | OUTPATIENT
Start: 2024-05-13 | End: 2025-05-13

## 2024-05-13 RX ORDER — LISINOPRIL 10 MG/1
10 TABLET ORAL DAILY
Qty: 90 TABLET | Refills: 3 | Status: SHIPPED | OUTPATIENT
Start: 2024-05-13 | End: 2025-05-13

## 2024-05-14 NOTE — PROGRESS NOTES
[facility]  History & Physical    SUBJECTIVE:     Chief Complaint:   Chief Complaint   Patient presents with    Follow-up    Medication Refill       History of Present Illness:  38 y.o. male who  has no past medical history on file. presents to clinic today for follow up related to DM and HTN. Compliant with all medication. No current complaints.      Allergies:  Review of patient's allergies indicates:   Allergen Reactions    Penicillin        Home Medications:  Current Outpatient Medications on File Prior to Visit   Medication Sig    cloZAPine (CLOZARIL) 100 MG Tab Take 100 mg by mouth every evening.     INVEGA SUSTENNA 234 mg/1.5 mL Syrg injection 156 mg. Inject 234mg every 28days    potassium chloride (MICRO-K) 10 MEQ CpSR Take 10 mEq by mouth once daily.    PREVIDENT 5000 SENSITIVE 1.1-5 % Pste U UTD    venlafaxine (EFFEXOR-XR) 150 MG Cp24 Take 150 mg by mouth once daily.    amoxicillin (AMOXIL) 500 MG Tab DNT (Patient not taking: Reported on 5/13/2024)    blood-glucose meter kit Use as instructed    lancets (LANCETS,ULTRA THIN) Misc 1 each by Misc.(Non-Drug; Combo Route) route 2 (two) times a day. (Patient not taking: Reported on 5/13/2024)    lancing device Misc 1 each by Misc.(Non-Drug; Combo Route) route 2 (two) times a day.    ONETOUCH DELICA PLUS LANCET 33 gauge Misc USE UTD BEFORE BREAKFAST (Patient not taking: Reported on 5/13/2024)    paliperidone (INVEGA) 9 MG TR24 Take 9 mg by mouth once daily.     No current facility-administered medications on file prior to visit.       No past medical history on file.  No past surgical history on file.  No family history on file.  Social History     Tobacco Use    Smoking status: Never   Substance Use Topics    Alcohol use: No        Review of Systems   Constitutional:  Negative for fever and weight loss.   HENT:  Negative for hearing loss and sore throat.    Eyes:  Negative for blurred vision.   Respiratory:  Negative for cough, shortness of breath and  wheezing.    Cardiovascular:  Negative for chest pain and leg swelling.   Gastrointestinal:  Negative for heartburn, nausea and vomiting.   Genitourinary:  Negative for dysuria.   Musculoskeletal:  Negative for back pain, joint pain and myalgias.   Neurological:  Negative for dizziness, weakness and headaches.        OBJECTIVE:     Vital Signs:  Pulse: 101 (05/13/24 1445)  BP: 126/87 (05/13/24 1445)  Body mass index is 30.49 kg/m².  Physical Exam  Constitutional:       General: He is not in acute distress.     Appearance: Normal appearance. He is not ill-appearing or diaphoretic.   HENT:      Head: Normocephalic and atraumatic.      Right Ear: External ear normal.      Left Ear: External ear normal.      Nose: Nose normal.      Mouth/Throat:      Mouth: Mucous membranes are moist.   Eyes:      Extraocular Movements: Extraocular movements intact.   Cardiovascular:      Rate and Rhythm: Normal rate and regular rhythm.      Pulses: Normal pulses.      Heart sounds: Normal heart sounds.   Pulmonary:      Effort: No respiratory distress.      Breath sounds: No wheezing.   Abdominal:      General: Abdomen is flat.      Tenderness: There is no guarding.   Musculoskeletal:         General: No swelling. Normal range of motion.   Skin:     General: Skin is warm and dry.   Neurological:      Mental Status: He is alert and oriented to person, place, and time.   Psychiatric:         Mood and Affect: Mood normal.         Behavior: Behavior normal.         Laboratory:  Hemoglobin A1C   Date Value Ref Range Status   05/13/2024 6.0 (H) 4.0 - 5.6 % Final     Comment:     ADA Screening Guidelines:  5.7-6.4%  Consistent with prediabetes  >or=6.5%  Consistent with diabetes    High levels of fetal hemoglobin interfere with the HbA1C  assay. Heterozygous hemoglobin variants (HbS, HgC, etc)do  not significantly interfere with this assay.   However, presence of multiple variants may affect accuracy.     10/24/2023 7.7 (H) 4.0 - 5.6 % Final  "    Comment:     ADA Screening Guidelines:  5.7-6.4%  Consistent with prediabetes  >or=6.5%  Consistent with diabetes    High levels of fetal hemoglobin interfere with the HbA1C  assay. Heterozygous hemoglobin variants (HbS, HgC, etc)do  not significantly interfere with this assay.   However, presence of multiple variants may affect accuracy.     06/26/2023 6.9 (H) 4.0 - 5.6 % Final     Comment:     ADA Screening Guidelines:  5.7-6.4%  Consistent with prediabetes  >or=6.5%  Consistent with diabetes    High levels of fetal hemoglobin interfere with the HbA1C  assay. Heterozygous hemoglobin variants (HbS, HgC, etc)do  not significantly interfere with this assay.   However, presence of multiple variants may affect accuracy.         A/P:  Robert Mahoney" was seen today for follow-up and medication refill.    Diagnoses and all orders for this visit:    Type 2 diabetes mellitus with hyperglycemia, without long-term current use of insulin  -     atorvastatin (LIPITOR) 40 MG tablet; Take 1 tablet (40 mg total) by mouth once daily.  -     empagliflozin (JARDIANCE) 10 mg tablet; Take 1 tablet (10 mg total) by mouth once daily.  -     lisinopriL 10 MG tablet; Take 1 tablet (10 mg total) by mouth once daily.  -     metFORMIN (GLUCOPHAGE) 500 MG tablet; Take 2 tablets (1,000 mg total) by mouth 2 (two) times daily with meals.  -     Hemoglobin A1C; Future  -     Microalbumin/Creatinine Ratio, Urine; Future   - Chronic condition controlled on current regimen    Moderate mixed hyperlipidemia not requiring statin therapy  -     atorvastatin (LIPITOR) 40 MG tablet; Take 1 tablet (40 mg total) by mouth once daily.   - Chronic condition controlled on current regimen    Refused pneumococcal vaccine   - Declined vaccinations at this time        Follow up in about 3 months (around 8/13/2024).      Roman Serna MD  [facility] PGY-3  05/14/2024        "

## 2024-05-15 NOTE — PROGRESS NOTES
I assume primary medical responsibility for this patient. I have reviewed the history, physical, and assessment & treatment plan with the resident and agree that the care is reasonable and necessary. This service has been performed by a resident without the presence of a teaching physician under the primary care exception. If necessary, an addendum of additional findings or evaluation beyond the resident documentation will be noted below.        Ernst Anand Jr., DO    [facility]

## 2024-10-03 ENCOUNTER — OFFICE VISIT (OUTPATIENT)
Dept: FAMILY MEDICINE | Facility: HOSPITAL | Age: 39
End: 2024-10-03
Payer: MEDICAID

## 2024-10-03 VITALS
BODY MASS INDEX: 30.05 KG/M2 | SYSTOLIC BLOOD PRESSURE: 127 MMHG | OXYGEN SATURATION: 97 % | HEIGHT: 70 IN | WEIGHT: 209.88 LBS | HEART RATE: 97 BPM | DIASTOLIC BLOOD PRESSURE: 85 MMHG

## 2024-10-03 DIAGNOSIS — I10 HYPERTENSION, UNSPECIFIED TYPE: ICD-10-CM

## 2024-10-03 DIAGNOSIS — E11.65 TYPE 2 DIABETES MELLITUS WITH HYPERGLYCEMIA, WITHOUT LONG-TERM CURRENT USE OF INSULIN: Primary | ICD-10-CM

## 2024-10-03 PROCEDURE — 99213 OFFICE O/P EST LOW 20 MIN: CPT

## 2024-10-03 NOTE — PROGRESS NOTES
"  [facility] CL[facility] NOTE  Follow-up Visit      SUBJECTIVE:     Patient: Robert Callahan is a 38 y.o. male.    Chief Compliant: No chief complaint on file.      History of Present Illness:  38 year old male PMHx of HTN, T2DM, Bipolar presenting today for follow up HTN and Diabetes. Mood symptoms currently controlled on Invega monthly and Effexor. BP controlled this visit at 127/85 on lisinopril 10mg. A1c last visit in May 6.0. Denies any acute issues. Currently on jardiance and metformin.     ROS  A 10+ review of systems was performed with pertinent positives and negatives noted above in the history of present illness. Other systems were negative unless otherwise specified.    OBJECTIVE:     Vital Signs (Most Recent)  Vitals:    10/03/24 1607   BP: 127/85   BP Location: Right arm   Patient Position: Sitting   Pulse: 97   SpO2: 97%   Weight: 95.2 kg (209 lb 14.1 oz)   Height: 5' 10" (1.778 m)     BMI: Body mass index is 30.11 kg/m².     Physical Exam:  Physical Exam  Constitutional:       Appearance: Normal appearance.   HENT:      Head: Normocephalic and atraumatic.      Mouth/Throat:      Mouth: Mucous membranes are moist.      Pharynx: Oropharynx is clear.   Eyes:      Extraocular Movements: Extraocular movements intact.      Conjunctiva/sclera: Conjunctivae normal.      Pupils: Pupils are equal, round, and reactive to light.   Cardiovascular:      Rate and Rhythm: Normal rate and regular rhythm.      Pulses: Normal pulses.      Heart sounds: Normal heart sounds.   Pulmonary:      Effort: Pulmonary effort is normal.      Breath sounds: Normal breath sounds.   Abdominal:      General: Abdomen is flat.      Palpations: Abdomen is soft.   Musculoskeletal:         General: Normal range of motion.      Cervical back: Normal range of motion and neck supple.   Skin:     General: Skin is warm and dry.      Capillary Refill: Capillary refill takes less than 2 seconds.   Neurological:      General: No focal " deficit present.      Mental Status: He is alert and oriented to person, place, and time.   Psychiatric:         Mood and Affect: Mood normal. Affect is flat.         Behavior: Behavior normal.         Thought Content: Thought content normal.          ASSESSMENT:   Robert Callahan is a 38 y.o. male who presents to clinic for HTN and T2DM.     1. Type 2 diabetes mellitus with hyperglycemia, without long-term current use of insulin    2. Hypertension, unspecified type         PLAN:     Type 2 diabetes mellitus with hyperglycemia, without long-term current use of insulin  -     Hemoglobin A1C; Future; Expected date: 10/03/2024  -     Comprehensive Metabolic Panel; Future; Expected date: 10/03/2024  - - Chronic condition.  - Well controlled.  - Reviewed previous labs, tests, and/or imaging obtained since last visit.  - Continue current medications.     Hypertension, unspecified type  - Chronic condition.  - Well controlled.  - Reviewed previous labs, tests, and/or imaging obtained since last visit.  - Continue current medications.     Provided patient with anticipatory guidance and return precautions. Treatment plan discussed with patient, all questions answered, and patient acknowledged understanding and verbal agreement.    Follow-up in: 3 months; or sooner PRN if acute concerns arise.    Case discussed with Dr. NEERAJ Dacosta Do, MD  [facility] PGY-2

## 2025-02-11 ENCOUNTER — OFFICE VISIT (OUTPATIENT)
Dept: FAMILY MEDICINE | Facility: HOSPITAL | Age: 40
End: 2025-02-11
Payer: MEDICAID

## 2025-02-11 VITALS
HEART RATE: 101 BPM | DIASTOLIC BLOOD PRESSURE: 81 MMHG | BODY MASS INDEX: 30.27 KG/M2 | WEIGHT: 211.44 LBS | SYSTOLIC BLOOD PRESSURE: 118 MMHG | HEIGHT: 70 IN

## 2025-02-11 DIAGNOSIS — F31.70 BIPOLAR AFFECTIVE DISORDER IN REMISSION: ICD-10-CM

## 2025-02-11 DIAGNOSIS — E11.65 TYPE 2 DIABETES MELLITUS WITH HYPERGLYCEMIA, WITHOUT LONG-TERM CURRENT USE OF INSULIN: Primary | ICD-10-CM

## 2025-02-11 PROCEDURE — 99213 OFFICE O/P EST LOW 20 MIN: CPT

## 2025-02-11 NOTE — PROGRESS NOTES
"  \[facility]\"" FAMILY PRACTICE CLINIC NOTE  Follow-up Visit      SUBJECTIVE:     Patient: Robert Callahan is a 39 y.o. male.    Chief Compliant:   Chief Complaint   Patient presents with    Follow-up       History of Present Illness:  38 year old male PMHx of HTN, T2DM, Bipolar presenting today for follow up Diabetes. Patient with sugar logs today ranging 90s-140s morning glucose. Last A1c 5.9 3 months ago. Denies any blurred vision, urinary symptoms, or abdominal pain. Endorsing adherence to metformin and jardiance.     ROS  A 10+ review of systems was performed with pertinent positives and negatives noted above in the history of present illness. Other systems were negative unless otherwise specified.    OBJECTIVE:     Vital Signs (Most Recent)  Vitals:    02/11/25 1602   BP: 118/81   Patient Position: Sitting   Pulse: 101   Weight: 95.9 kg (211 lb 6.7 oz)   Height: 5' 10" (1.778 m)     BMI: Body mass index is 30.34 kg/m².     Physical Exam:  Physical Exam  Constitutional:       Appearance: Normal appearance.   HENT:      Head: Normocephalic and atraumatic.      Mouth/Throat:      Mouth: Mucous membranes are moist.      Pharynx: Oropharynx is clear.   Eyes:      Extraocular Movements: Extraocular movements intact.      Conjunctiva/sclera: Conjunctivae normal.      Pupils: Pupils are equal, round, and reactive to light.   Cardiovascular:      Rate and Rhythm: Normal rate and regular rhythm.      Pulses: Normal pulses.      Heart sounds: Normal heart sounds.   Pulmonary:      Effort: Pulmonary effort is normal.      Breath sounds: Normal breath sounds.   Abdominal:      General: Abdomen is flat.      Palpations: Abdomen is soft.   Musculoskeletal:         General: Normal range of motion.      Cervical back: Normal range of motion and neck supple.   Skin:     General: Skin is warm and dry.      Capillary Refill: Capillary refill takes less than 2 seconds.   Neurological:      General: No focal deficit present.      " Mental Status: He is alert and oriented to person, place, and time.   Psychiatric:         Mood and Affect: Mood normal. Affect is flat.         Behavior: Behavior normal.         Thought Content: Thought content normal.          ASSESSMENT:   Robert Callahan is a 39 y.o. male who presents to clinic for follow up.     1. Type 2 diabetes mellitus with hyperglycemia, without long-term current use of insulin    2. Bipolar affective disorder in remission         PLAN:       Type 2 diabetes mellitus with hyperglycemia, without long-term current use of insulin  -     Hemoglobin A1C; Future; Expected date: 02/11/2025  -     Microalbumin/creatinine urine ratio; Future; Expected date: 02/11/2025  - Repeat A1c as above, continue home metformin and jardiance.     Bipolar affective disorder in remission  -     Comprehensive Metabolic Panel; Future; Expected date: 02/11/2025  -     CBC Auto Differential; Future; Expected date: 02/11/2025  - On clozaril for bipolar, will check CBC and CMP for changes to chemistries and blood counts.     Provided patient with anticipatory guidance and return precautions. Treatment plan discussed with patient, all questions answered, and patient acknowledged understanding and verbal agreement.      Follow-up in: 3 months; or sooner PRN if acute concerns arise.      Case discussed with Dr. CHRISTI Dacosta Do, MD  [facility] PGY-2

## 2025-05-15 ENCOUNTER — OFFICE VISIT (OUTPATIENT)
Dept: FAMILY MEDICINE | Facility: HOSPITAL | Age: 40
End: 2025-05-15
Payer: MEDICAID

## 2025-05-15 VITALS
WEIGHT: 212.5 LBS | BODY MASS INDEX: 30.42 KG/M2 | HEART RATE: 89 BPM | HEIGHT: 70 IN | OXYGEN SATURATION: 96 % | DIASTOLIC BLOOD PRESSURE: 79 MMHG | SYSTOLIC BLOOD PRESSURE: 112 MMHG

## 2025-05-15 DIAGNOSIS — E11.65 TYPE 2 DIABETES MELLITUS WITH HYPERGLYCEMIA, WITHOUT LONG-TERM CURRENT USE OF INSULIN: ICD-10-CM

## 2025-05-15 DIAGNOSIS — E78.2 MODERATE MIXED HYPERLIPIDEMIA NOT REQUIRING STATIN THERAPY: ICD-10-CM

## 2025-05-15 PROCEDURE — 99213 OFFICE O/P EST LOW 20 MIN: CPT

## 2025-05-15 RX ORDER — METFORMIN HYDROCHLORIDE 500 MG/1
1000 TABLET ORAL 2 TIMES DAILY WITH MEALS
Qty: 360 TABLET | Refills: 3 | Status: SHIPPED | OUTPATIENT
Start: 2025-05-15 | End: 2026-05-15

## 2025-05-15 RX ORDER — ATORVASTATIN CALCIUM 40 MG/1
40 TABLET, FILM COATED ORAL DAILY
Qty: 90 TABLET | Refills: 3 | Status: SHIPPED | OUTPATIENT
Start: 2025-05-15 | End: 2026-05-15

## 2025-05-15 RX ORDER — LISINOPRIL 10 MG/1
10 TABLET ORAL DAILY
Qty: 90 TABLET | Refills: 3 | Status: SHIPPED | OUTPATIENT
Start: 2025-05-15 | End: 2026-05-15

## 2025-05-15 NOTE — PROGRESS NOTES
"  [facility] NOTE  Follow-up Visit      SUBJECTIVE:     Patient: Robert Callahan is a 39 y.o. male.    Chief Compliant:   Chief Complaint   Patient presents with    Follow-up       History of Present Illness:  38 year old male PMHx of HTN, T2DM, Bipolar on Invega monthly presenting today for follow up diabetes. A1c 3 months ago 6.3. Denies any acute complaints. Brought glucose log, numbers ranging from 100-150's. Denies any catabolic symptoms such as thirst and urination, unexplained weight loss, fatigue, and a heightened appetite.       ROS  A 10+ review of systems was performed with pertinent positives and negatives noted above in the history of present illness. Other systems were negative unless otherwise specified.    OBJECTIVE:     Vital Signs (Most Recent)  Vitals:    05/15/25 1608   BP: 112/79   Pulse: 89   SpO2: 96%   Weight: 96.4 kg (212 lb 8.4 oz)   Height: 5' 10" (1.778 m)     BMI: Body mass index is 30.49 kg/m².     Physical Exam:  Physical Exam  Constitutional:       Appearance: Normal appearance.   HENT:      Head: Normocephalic and atraumatic.      Mouth/Throat:      Mouth: Mucous membranes are moist.      Pharynx: Oropharynx is clear.   Eyes:      Extraocular Movements: Extraocular movements intact.      Conjunctiva/sclera: Conjunctivae normal.      Pupils: Pupils are equal, round, and reactive to light.   Cardiovascular:      Rate and Rhythm: Normal rate and regular rhythm.      Pulses: Normal pulses.      Heart sounds: Normal heart sounds.   Pulmonary:      Effort: Pulmonary effort is normal.      Breath sounds: Normal breath sounds.   Abdominal:      General: Abdomen is flat.      Palpations: Abdomen is soft.   Musculoskeletal:         General: Normal range of motion.      Cervical back: Normal range of motion and neck supple.   Skin:     General: Skin is warm and dry.      Capillary Refill: Capillary refill takes less than 2 seconds.   Neurological:      General: No focal " deficit present.      Mental Status: He is alert and oriented to person, place, and time.   Psychiatric:         Mood and Affect: Mood normal. Affect is flat.         Behavior: Behavior normal.         Thought Content: Thought content normal.          ASSESSMENT:   Robert Callahan is a 39 y.o. male who presents to clinic to for    1. Type 2 diabetes mellitus with hyperglycemia, without long-term current use of insulin    2. Moderate mixed hyperlipidemia not requiring statin therapy         PLAN:     Type 2 diabetes mellitus with hyperglycemia, without long-term current use of insulin  -     empagliflozin (JARDIANCE) 10 mg tablet; Take 1 tablet (10 mg total) by mouth once daily.  Dispense: 30 tablet; Refill: 11  -     metFORMIN (GLUCOPHAGE) 500 MG tablet; Take 2 tablets (1,000 mg total) by mouth 2 (two) times daily with meals.  Dispense: 360 tablet; Refill: 3  -     lisinopriL 10 MG tablet; Take 1 tablet (10 mg total) by mouth once daily.  Dispense: 90 tablet; Refill: 3  -     atorvastatin (LIPITOR) 40 MG tablet; Take 1 tablet (40 mg total) by mouth once daily.  Dispense: 90 tablet; Refill: 3  - Chronic controlled.  - Continue above medications  - Repeat A1c in 3 months.     Moderate mixed hyperlipidemia not requiring statin therapy  -     atorvastatin (LIPITOR) 40 MG tablet; Take 1 tablet (40 mg total) by mouth once daily.  Dispense: 90 tablet; Refill: 3    Provided patient with anticipatory guidance and return precautions. Treatment plan discussed with patient, all questions answered, and patient acknowledged understanding and verbal agreement.      Follow-up in: 3 months; or sooner PRN if acute concerns arise.      Case discussed with Dr. COREY Dacosta Do, MD  [facility] PGY-2

## 2025-08-27 ENCOUNTER — OFFICE VISIT (OUTPATIENT)
Dept: FAMILY MEDICINE | Facility: HOSPITAL | Age: 40
End: 2025-08-27
Payer: MEDICAID

## 2025-08-27 VITALS
RESPIRATION RATE: 18 BRPM | HEIGHT: 70 IN | WEIGHT: 210.56 LBS | BODY MASS INDEX: 30.14 KG/M2 | DIASTOLIC BLOOD PRESSURE: 72 MMHG | OXYGEN SATURATION: 96 % | SYSTOLIC BLOOD PRESSURE: 110 MMHG | HEART RATE: 96 BPM

## 2025-08-27 DIAGNOSIS — E11.65 TYPE 2 DIABETES MELLITUS WITH HYPERGLYCEMIA, WITHOUT LONG-TERM CURRENT USE OF INSULIN: Primary | ICD-10-CM

## 2025-08-27 PROCEDURE — 99214 OFFICE O/P EST MOD 30 MIN: CPT
